# Patient Record
Sex: MALE | Race: WHITE | NOT HISPANIC OR LATINO | Employment: UNEMPLOYED | URBAN - METROPOLITAN AREA
[De-identification: names, ages, dates, MRNs, and addresses within clinical notes are randomized per-mention and may not be internally consistent; named-entity substitution may affect disease eponyms.]

---

## 2023-05-16 ENCOUNTER — OFFICE VISIT (OUTPATIENT)
Dept: PRIMARY CARE | Facility: CLINIC | Age: 28
End: 2023-05-16
Payer: MEDICAID

## 2023-05-16 ENCOUNTER — TELEPHONE (OUTPATIENT)
Dept: PRIMARY CARE | Facility: CLINIC | Age: 28
End: 2023-05-16

## 2023-05-16 ENCOUNTER — LAB (OUTPATIENT)
Dept: LAB | Facility: LAB | Age: 28
End: 2023-05-16
Payer: MEDICAID

## 2023-05-16 VITALS
OXYGEN SATURATION: 97 % | TEMPERATURE: 97.1 F | DIASTOLIC BLOOD PRESSURE: 80 MMHG | HEART RATE: 90 BPM | SYSTOLIC BLOOD PRESSURE: 122 MMHG | WEIGHT: 248 LBS

## 2023-05-16 DIAGNOSIS — K52.9 GASTROENTERITIS: Primary | ICD-10-CM

## 2023-05-16 DIAGNOSIS — Z87.19 HISTORY OF DUODENAL ULCER: ICD-10-CM

## 2023-05-16 DIAGNOSIS — Z86.2 HISTORY OF IRON DEFICIENCY ANEMIA: ICD-10-CM

## 2023-05-16 DIAGNOSIS — Z87.19 HISTORY OF ESOPHAGEAL ULCER: ICD-10-CM

## 2023-05-16 DIAGNOSIS — K21.9 GASTROESOPHAGEAL REFLUX DISEASE WITHOUT ESOPHAGITIS: ICD-10-CM

## 2023-05-16 DIAGNOSIS — K92.1 BLACK STOOL: ICD-10-CM

## 2023-05-16 DIAGNOSIS — K52.9 GASTROENTERITIS: ICD-10-CM

## 2023-05-16 LAB
ALANINE AMINOTRANSFERASE (SGPT) (U/L) IN SER/PLAS: 20 U/L (ref 10–52)
ALBUMIN (G/DL) IN SER/PLAS: 4.1 G/DL (ref 3.4–5)
ALKALINE PHOSPHATASE (U/L) IN SER/PLAS: 69 U/L (ref 33–120)
ANION GAP IN SER/PLAS: 11 MMOL/L (ref 10–20)
ASPARTATE AMINOTRANSFERASE (SGOT) (U/L) IN SER/PLAS: 13 U/L (ref 9–39)
BASOPHILS (10*3/UL) IN BLOOD BY AUTOMATED COUNT: 0.04 X10E9/L (ref 0–0.1)
BASOPHILS/100 LEUKOCYTES IN BLOOD BY AUTOMATED COUNT: 0.6 % (ref 0–2)
BILIRUBIN TOTAL (MG/DL) IN SER/PLAS: 0.6 MG/DL (ref 0–1.2)
CALCIUM (MG/DL) IN SER/PLAS: 8.6 MG/DL (ref 8.6–10.3)
CARBON DIOXIDE, TOTAL (MMOL/L) IN SER/PLAS: 26 MMOL/L (ref 21–32)
CHLORIDE (MMOL/L) IN SER/PLAS: 105 MMOL/L (ref 98–107)
CREATININE (MG/DL) IN SER/PLAS: 0.94 MG/DL (ref 0.5–1.3)
EOSINOPHILS (10*3/UL) IN BLOOD BY AUTOMATED COUNT: 0.21 X10E9/L (ref 0–0.7)
EOSINOPHILS/100 LEUKOCYTES IN BLOOD BY AUTOMATED COUNT: 3.2 % (ref 0–6)
ERYTHROCYTE DISTRIBUTION WIDTH (RATIO) BY AUTOMATED COUNT: 13.7 % (ref 11.5–14.5)
ERYTHROCYTE MEAN CORPUSCULAR HEMOGLOBIN CONCENTRATION (G/DL) BY AUTOMATED: 33 G/DL (ref 32–36)
ERYTHROCYTE MEAN CORPUSCULAR VOLUME (FL) BY AUTOMATED COUNT: 80 FL (ref 80–100)
ERYTHROCYTES (10*6/UL) IN BLOOD BY AUTOMATED COUNT: 5.72 X10E12/L (ref 4.5–5.9)
GFR MALE: >90 ML/MIN/1.73M2
GLUCOSE (MG/DL) IN SER/PLAS: 82 MG/DL (ref 74–99)
HEMATOCRIT (%) IN BLOOD BY AUTOMATED COUNT: 45.8 % (ref 41–52)
HEMOGLOBIN (G/DL) IN BLOOD: 15.1 G/DL (ref 13.5–17.5)
IMMATURE GRANULOCYTES/100 LEUKOCYTES IN BLOOD BY AUTOMATED COUNT: 0.3 % (ref 0–0.9)
LEUKOCYTES (10*3/UL) IN BLOOD BY AUTOMATED COUNT: 6.6 X10E9/L (ref 4.4–11.3)
LYMPHOCYTES (10*3/UL) IN BLOOD BY AUTOMATED COUNT: 1.6 X10E9/L (ref 1.2–4.8)
LYMPHOCYTES/100 LEUKOCYTES IN BLOOD BY AUTOMATED COUNT: 24.2 % (ref 13–44)
MONOCYTES (10*3/UL) IN BLOOD BY AUTOMATED COUNT: 0.79 X10E9/L (ref 0.1–1)
MONOCYTES/100 LEUKOCYTES IN BLOOD BY AUTOMATED COUNT: 11.9 % (ref 2–10)
NEUTROPHILS (10*3/UL) IN BLOOD BY AUTOMATED COUNT: 3.96 X10E9/L (ref 1.2–7.7)
NEUTROPHILS/100 LEUKOCYTES IN BLOOD BY AUTOMATED COUNT: 59.8 % (ref 40–80)
PLATELETS (10*3/UL) IN BLOOD AUTOMATED COUNT: 229 X10E9/L (ref 150–450)
POTASSIUM (MMOL/L) IN SER/PLAS: 4 MMOL/L (ref 3.5–5.3)
PROTEIN TOTAL: 6.7 G/DL (ref 6.4–8.2)
SODIUM (MMOL/L) IN SER/PLAS: 138 MMOL/L (ref 136–145)
UREA NITROGEN (MG/DL) IN SER/PLAS: 8 MG/DL (ref 6–23)

## 2023-05-16 PROCEDURE — 80053 COMPREHEN METABOLIC PANEL: CPT

## 2023-05-16 PROCEDURE — 36415 COLL VENOUS BLD VENIPUNCTURE: CPT

## 2023-05-16 PROCEDURE — 85025 COMPLETE CBC W/AUTO DIFF WBC: CPT

## 2023-05-16 PROCEDURE — 99214 OFFICE O/P EST MOD 30 MIN: CPT | Performed by: PHYSICIAN ASSISTANT

## 2023-05-16 RX ORDER — OMEPRAZOLE 40 MG/1
40 CAPSULE, DELAYED RELEASE ORAL
Qty: 30 CAPSULE | Refills: 1 | Status: SHIPPED | OUTPATIENT
Start: 2023-05-16 | End: 2023-10-26 | Stop reason: SDUPTHER

## 2023-05-16 RX ORDER — OMEPRAZOLE 40 MG/1
CAPSULE, DELAYED RELEASE ORAL
COMMUNITY
Start: 2022-02-02 | End: 2023-05-16 | Stop reason: SDUPTHER

## 2023-05-16 ASSESSMENT — ENCOUNTER SYMPTOMS
SHORTNESS OF BREATH: 0
FEVER: 0
CHILLS: 0

## 2023-05-16 NOTE — PROGRESS NOTES
"Subjective   Patient ID: Bebeto Baker is a 27 y.o. male who presents for Diarrhea (Pt having dark black stool x 2 day/lightheadness).    HPI   Patient complains that 2 days ago started with diarrhea and vomiting and nausea. No vomiting since 2 days ago but still with watery diarrhea. Had mild abdominal cramping. Started taking Pepto -- later in the day and since then watery stools have been black.  No tarry stools. No fevers or chills. Symptoms are starting to improve -- much less nauseous and no vomiting.     Had 2 beers the night before onset. Had 6-7 beers 2 days before onset.  States \"he periodically dose that without problems.\" Felt fine 3 days ago.   No NSAID use.   Is on Omeprazole 40mg nearly every day the past year -- gets reflux when he doesn't take it.   Hx of iron deficiency possibly due to small bleeding ulcer that was found.   EGD in 2017 showed distal esophageal ulcers and duodenal bulb ulcer. Was supposed to have repeat EGD 12 weeks later with Dr Henson but never did that.     Energy level has been ok. Has been feeling a little lightheaded when he first stands up the past few months. Periodically gets a little abdominal discomfort.     States he is leaving for [employer].   Review of Systems   Constitutional:  Negative for chills and fever.   Respiratory:  Negative for shortness of breath.          Objective   /80   Pulse 90   Temp 36.2 °C (97.1 °F)   Wt 112 kg (248 lb)   SpO2 97%     Physical Exam  HENT:      Head: Normocephalic.   Eyes:      General: No scleral icterus.  Cardiovascular:      Rate and Rhythm: Regular rhythm.   Pulmonary:      Effort: Pulmonary effort is normal.      Breath sounds: Normal breath sounds.   Abdominal:      Palpations: Abdomen is soft. There is no mass.      Tenderness: There is no abdominal tenderness.   Skin:     General: Skin is warm and dry.   Neurological:      Mental Status: He is alert.   Psychiatric:         Mood and Affect: Affect normal. "           Assessment/Plan   Diagnoses and all orders for this visit:  Gastroenteritis  -     CBC and Auto Differential; Future  -     Comprehensive Metabolic Panel; Future  Black stool  -     CBC and Auto Differential; Future  -     Comprehensive Metabolic Panel; Future  Gastroesophageal reflux disease without esophagitis  -     omeprazole (PriLOSEC) 40 mg DR capsule; Take 1 capsule (40 mg) by mouth once daily.  -     Referral to Gastroenterology; Future  History of esophageal ulcer  -     omeprazole (PriLOSEC) 40 mg DR capsule; Take 1 capsule (40 mg) by mouth once daily.  -     Referral to Gastroenterology; Future  History of duodenal ulcer  -     omeprazole (PriLOSEC) 40 mg DR capsule; Take 1 capsule (40 mg) by mouth once daily.  -     Referral to Gastroenterology; Future  History of iron deficiency anemia  -     CBC and Auto Differential; Future           Get CBC and CMP today to evaluate further.  Referred back to Dr. Henson (GI).   Advised to take his Prilosec daily consistently and sent in a refill for this.  Suspect his symptoms were likely viral since he is already starting to improve and possibly the black stools were related to the Pepto.  Advised to hold off on using the Pepto and instead can use Imodium if needed.    Hydrate well.  Alcohol the next few days.  Go to ER if significantly worsens.  Follow-up as needed.

## 2023-05-16 NOTE — TELEPHONE ENCOUNTER
----- Message from Faizan Clayton PA-C sent at 5/16/2023  2:16 PM EDT -----  Inform pt that his blood tests were all normal, including no anemia and normal white blood cell count.

## 2023-05-22 ENCOUNTER — APPOINTMENT (OUTPATIENT)
Dept: PRIMARY CARE | Facility: CLINIC | Age: 28
End: 2023-05-22
Payer: MEDICAID

## 2023-08-17 ENCOUNTER — OFFICE VISIT (OUTPATIENT)
Dept: PRIMARY CARE | Facility: CLINIC | Age: 28
End: 2023-08-17
Payer: MEDICAID

## 2023-08-17 VITALS
WEIGHT: 248 LBS | TEMPERATURE: 97.9 F | SYSTOLIC BLOOD PRESSURE: 124 MMHG | OXYGEN SATURATION: 97 % | DIASTOLIC BLOOD PRESSURE: 80 MMHG | HEART RATE: 81 BPM

## 2023-08-17 DIAGNOSIS — S02.2XXD CLOSED FRACTURE OF NASAL BONE WITH ROUTINE HEALING, SUBSEQUENT ENCOUNTER: Primary | ICD-10-CM

## 2023-08-17 DIAGNOSIS — F20.9 SCHIZOPHRENIA, UNSPECIFIED TYPE (MULTI): ICD-10-CM

## 2023-08-17 PROBLEM — G47.33 OBSTRUCTIVE SLEEP APNEA SYNDROME: Status: ACTIVE | Noted: 2017-10-23

## 2023-08-17 PROBLEM — F31.9 BIPOLAR DISORDER, UNSPECIFIED (MULTI): Status: ACTIVE | Noted: 2023-08-17

## 2023-08-17 PROBLEM — K21.9 GASTROESOPHAGEAL REFLUX DISEASE: Status: ACTIVE | Noted: 2021-03-19

## 2023-08-17 PROBLEM — D50.9 IRON DEFICIENCY ANEMIA: Status: ACTIVE | Noted: 2023-08-17

## 2023-08-17 PROCEDURE — 99213 OFFICE O/P EST LOW 20 MIN: CPT | Performed by: FAMILY MEDICINE

## 2023-08-17 PROCEDURE — 1036F TOBACCO NON-USER: CPT | Performed by: FAMILY MEDICINE

## 2023-08-17 RX ORDER — LOPERAMIDE HYDROCHLORIDE 2 MG/1
2 CAPSULE ORAL
COMMUNITY
Start: 2021-03-19 | End: 2023-11-30 | Stop reason: ALTCHOICE

## 2023-08-17 ASSESSMENT — ENCOUNTER SYMPTOMS
CHILLS: 0
SHORTNESS OF BREATH: 0
ABDOMINAL PAIN: 0
COUGH: 0
FATIGUE: 0
NERVOUS/ANXIOUS: 0
FACIAL SWELLING: 0
COLOR CHANGE: 0
DIARRHEA: 0
VOMITING: 0
SINUS PRESSURE: 0
SLEEP DISTURBANCE: 0
SORE THROAT: 0
HALLUCINATIONS: 0
NAUSEA: 0
SINUS PAIN: 0
RHINORRHEA: 0
FEVER: 0
PALPITATIONS: 0
HYPERACTIVE: 0
CONSTIPATION: 0
AGITATION: 0
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
## 2023-08-17 NOTE — PROGRESS NOTES
Subjective   Patient ID: Bebeto Baker is a 28 y.o. male who presents for Hospital Follow-up ([facility] on 8/2/23 Re: broken nose).  HPI    Patient Active Problem List   Diagnosis    Bipolar disorder, unspecified (CMS/LTAC, located within [facility])    Gastroesophageal reflux disease    Idiopathic scoliosis and kyphoscoliosis    Iron deficiency anemia    Migraine headache    Obstructive sleep apnea syndrome    Schizophrenia (CMS/LTAC, located within [facility])       Social Connections: Not on file       Current Outpatient Medications on File Prior to Visit   Medication Sig Dispense Refill    omeprazole (PriLOSEC) 40 mg DR capsule Take 1 capsule (40 mg) by mouth once daily. 30 capsule 1    loperamide (Imodium) 2 mg capsule Take 1 capsule (2 mg) by mouth.       No current facility-administered medications on file prior to visit.        Vitals:    08/17/23 1405   BP: 124/80   Pulse: 81   Temp: 36.6 °C (97.9 °F)   SpO2: 97%     Vitals:    08/17/23 1405   Weight: 112 kg (248 lb)       Review of Systems    Objective     Physical Exam    No visits with results within 2 Month(s) from this visit.   Latest known visit with results is:   Lab on 05/16/2023   Component Date Value Ref Range Status    WBC 05/16/2023 6.6  4.4 - 11.3 x10E9/L Final    RBC 05/16/2023 5.72  4.50 - 5.90 x10E12/L Final    Hemoglobin 05/16/2023 15.1  13.5 - 17.5 g/dL Final    Hematocrit 05/16/2023 45.8  41.0 - 52.0 % Final    MCV 05/16/2023 80  80 - 100 fL Final    MCHC 05/16/2023 33.0  32.0 - 36.0 g/dL Final    Platelets 05/16/2023 229  150 - 450 x10E9/L Final    RDW 05/16/2023 13.7  11.5 - 14.5 % Final    Neutrophils % 05/16/2023 59.8  40.0 - 80.0 % Final    Immature Granulocytes %, Automated 05/16/2023 0.3  0.0 - 0.9 % Final     Immature Granulocyte Count (IG) includes promyelocytes,    myelocytes and metamyelocytes but does not include bands.   Percent differential counts (%) should be interpreted in the   context of the absolute cell counts (cells/L).    Lymphocytes % 05/16/2023 24.2  13.0 - 44.0 %  Final    Monocytes % 05/16/2023 11.9  2.0 - 10.0 % Final    Eosinophils % 05/16/2023 3.2  0.0 - 6.0 % Final    Basophils % 05/16/2023 0.6  0.0 - 2.0 % Final    Neutrophils Absolute 05/16/2023 3.96  1.20 - 7.70 x10E9/L Final    Lymphocytes Absolute 05/16/2023 1.60  1.20 - 4.80 x10E9/L Final    Monocytes Absolute 05/16/2023 0.79  0.10 - 1.00 x10E9/L Final    Eosinophils Absolute 05/16/2023 0.21  0.00 - 0.70 x10E9/L Final    Basophils Absolute 05/16/2023 0.04  0.00 - 0.10 x10E9/L Final    Glucose 05/16/2023 82  74 - 99 mg/dL Final    Sodium 05/16/2023 138  136 - 145 mmol/L Final    Potassium 05/16/2023 4.0  3.5 - 5.3 mmol/L Final    Chloride 05/16/2023 105  98 - 107 mmol/L Final    Bicarbonate 05/16/2023 26  21 - 32 mmol/L Final    Anion Gap 05/16/2023 11  10 - 20 mmol/L Final    Urea Nitrogen 05/16/2023 8  6 - 23 mg/dL Final    Creatinine 05/16/2023 0.94  0.50 - 1.30 mg/dL Final    GFR MALE 05/16/2023 >90  >90 mL/min/1.73m2 Final     CALCULATIONS OF ESTIMATED GFR ARE PERFORMED   USING THE 2021 CKD-EPI STUDY REFIT EQUATION   WITHOUT THE RACE VARIABLE FOR THE IDMS-TRACEABLE   CREATININE METHODS.    https://jasn.asnjournals.org/content/early/2021/09/22/ASN.4730913369    Calcium 05/16/2023 8.6  8.6 - 10.3 mg/dL Final    Albumin 05/16/2023 4.1  3.4 - 5.0 g/dL Final    Alkaline Phosphatase 05/16/2023 69  33 - 120 U/L Final    Total Protein 05/16/2023 6.7  6.4 - 8.2 g/dL Final    AST 05/16/2023 13  9 - 39 U/L Final    Total Bilirubin 05/16/2023 0.6  0.0 - 1.2 mg/dL Final    ALT (SGPT) 05/16/2023 20  10 - 52 U/L Final     Patients treated with Sulfasalazine may generate    falsely decreased results for ALT.       Assessment/Plan   {Assess/PlanSmartLinks:[id number]}

## 2023-10-26 ENCOUNTER — OFFICE VISIT (OUTPATIENT)
Dept: PRIMARY CARE | Facility: CLINIC | Age: 28
End: 2023-10-26
Payer: MEDICAID

## 2023-10-26 VITALS
HEART RATE: 69 BPM | SYSTOLIC BLOOD PRESSURE: 126 MMHG | TEMPERATURE: 97.4 F | DIASTOLIC BLOOD PRESSURE: 84 MMHG | WEIGHT: 253 LBS | OXYGEN SATURATION: 96 %

## 2023-10-26 DIAGNOSIS — T63.441A BEE STING, ACCIDENTAL OR UNINTENTIONAL, INITIAL ENCOUNTER: ICD-10-CM

## 2023-10-26 DIAGNOSIS — K21.9 GASTROESOPHAGEAL REFLUX DISEASE WITHOUT ESOPHAGITIS: Primary | ICD-10-CM

## 2023-10-26 DIAGNOSIS — Z87.19 HISTORY OF DUODENAL ULCER: ICD-10-CM

## 2023-10-26 DIAGNOSIS — Z87.19 HISTORY OF ESOPHAGEAL ULCER: ICD-10-CM

## 2023-10-26 PROCEDURE — 99214 OFFICE O/P EST MOD 30 MIN: CPT | Performed by: PHYSICIAN ASSISTANT

## 2023-10-26 PROCEDURE — 1036F TOBACCO NON-USER: CPT | Performed by: PHYSICIAN ASSISTANT

## 2023-10-26 RX ORDER — OMEPRAZOLE 40 MG/1
40 CAPSULE, DELAYED RELEASE ORAL
Qty: 30 CAPSULE | Refills: 2 | Status: SHIPPED | OUTPATIENT
Start: 2023-10-26 | End: 2024-02-21

## 2023-10-26 RX ORDER — PREDNISONE 20 MG/1
40 TABLET ORAL DAILY
COMMUNITY
Start: 2023-10-24 | End: 2023-10-29

## 2023-10-26 ASSESSMENT — ENCOUNTER SYMPTOMS
BLOOD IN STOOL: 0
CHILLS: 0
DIARRHEA: 0
ABDOMINAL PAIN: 0
VOMITING: 0
FEVER: 0

## 2023-10-26 NOTE — PROGRESS NOTES
Subjective   Patient ID: Bebeto Baker is a 28 y.o. male who presents for GERD (Recheck   Discuss meds).    HPI     Presents for recheck of GERD.   Is on Omeprazole 40mg every day but been out of it recently. Gets bad reflux when he doesn't take the medicine. Some foods even cause breakthrough reflux.   EGD in 2017 showed distal esophageal ulcers and duodenal bulb ulcer. Was supposed to have repeat EGD 12 weeks later with Dr Henson but never did that.  Was referred again earlier this year for that but never went.    Went to [facility] 2 days ago for bee sting on his arm and placed on Prednisone. It has improved. No longer having swelling or redness. Is barely itchy still.        reports that he quit smoking about 21 months ago. His smoking use included cigarettes. He smoked an average of .5 packs per day. He has never used smokeless tobacco.    Review of Systems   Constitutional:  Negative for chills and fever.   Gastrointestinal:  Negative for abdominal pain, blood in stool, diarrhea and vomiting.       Objective   /84   Pulse 69   Temp 36.3 °C (97.4 °F)   Wt 115 kg (253 lb)   SpO2 96%     Physical Exam  Vitals and nursing note reviewed.   HENT:      Head: Normocephalic.   Eyes:      General: No scleral icterus.  Cardiovascular:      Rate and Rhythm: Normal rate and regular rhythm.   Pulmonary:      Effort: Pulmonary effort is normal.      Breath sounds: Normal breath sounds.   Abdominal:      Palpations: Abdomen is soft. There is no mass.      Tenderness: There is no abdominal tenderness.   Skin:     General: Skin is warm and dry.      Comments: Skin on left elbow area without redness or swelling where bee stung him.   Neurological:      Mental Status: He is alert.   Psychiatric:         Mood and Affect: Affect normal.         Assessment/Plan   Diagnoses and all orders for this visit:  Gastroesophageal reflux disease without esophagitis  -     omeprazole (PriLOSEC) 40 mg DR capsule; Take 1 capsule (40  mg) by mouth once daily.  History of esophageal ulcer  -     omeprazole (PriLOSEC) 40 mg DR capsule; Take 1 capsule (40 mg) by mouth once daily.  History of duodenal ulcer  -     omeprazole (PriLOSEC) 40 mg DR capsule; Take 1 capsule (40 mg) by mouth once daily.  Bee sting, accidental or unintentional, initial encounter       Refilled omeprazole 40 mg daily.  Avoid aggravating foods.  Can supplement with OTC famotidine if needed.  Stressed importance of scheduling with his GI Dr. Henson as was previously referred.  Gave him Dr. Henson's phone number for him to call and schedule even.  Can take prednisone for another day or 2 but it appears that localized bee sting reaction has nearly resolved.  Follow-up for recheck with PCP Dr. Paige in 3 to 4 months.

## 2023-11-30 ENCOUNTER — LAB (OUTPATIENT)
Dept: LAB | Facility: LAB | Age: 28
End: 2023-11-30
Payer: MEDICAID

## 2023-11-30 ENCOUNTER — OFFICE VISIT (OUTPATIENT)
Dept: GASTROENTEROLOGY | Facility: CLINIC | Age: 28
End: 2023-11-30
Payer: MEDICAID

## 2023-11-30 VITALS
HEART RATE: 73 BPM | DIASTOLIC BLOOD PRESSURE: 80 MMHG | BODY MASS INDEX: 33.4 KG/M2 | SYSTOLIC BLOOD PRESSURE: 136 MMHG | HEIGHT: 73 IN | WEIGHT: 252 LBS

## 2023-11-30 DIAGNOSIS — K21.9 GASTROESOPHAGEAL REFLUX DISEASE, UNSPECIFIED WHETHER ESOPHAGITIS PRESENT: Primary | ICD-10-CM

## 2023-11-30 DIAGNOSIS — K52.9 CHRONIC DIARRHEA: ICD-10-CM

## 2023-11-30 LAB — TSH SERPL-ACNC: 1.41 MIU/L (ref 0.44–3.98)

## 2023-11-30 PROCEDURE — 99204 OFFICE O/P NEW MOD 45 MIN: CPT | Performed by: NURSE PRACTITIONER

## 2023-11-30 PROCEDURE — 36415 COLL VENOUS BLD VENIPUNCTURE: CPT

## 2023-11-30 PROCEDURE — 1036F TOBACCO NON-USER: CPT | Performed by: NURSE PRACTITIONER

## 2023-11-30 PROCEDURE — 84443 ASSAY THYROID STIM HORMONE: CPT

## 2023-11-30 ASSESSMENT — ENCOUNTER SYMPTOMS
WEAKNESS: 0
ADENOPATHY: 0
CONFUSION: 0
ROS GI COMMENTS: SEE HPI
BRUISES/BLEEDS EASILY: 0
SORE THROAT: 0
DIZZINESS: 0
WOUND: 0
COUGH: 0
ARTHRALGIAS: 0
JOINT SWELLING: 0
TROUBLE SWALLOWING: 0
DIFFICULTY URINATING: 0
SHORTNESS OF BREATH: 0
CHILLS: 0
FEVER: 0
PALPITATIONS: 0

## 2023-11-30 NOTE — ASSESSMENT & PLAN NOTE
Ongoing many years. Previously used imodium. Celiac panel negative in 2021. Will check TSH, pancreatic elastase, calprotectin, cdiff, and schedule for colonoscopy.

## 2023-11-30 NOTE — ASSESSMENT & PLAN NOTE
History of GERD and esophageal ulcers in 2017. Was supposed to repeat EGD but did not have this done. Currently on omeprazole 40mg daily and still having some breakthrough symptoms. Will repeat EGD.

## 2023-11-30 NOTE — PROGRESS NOTES
Subjective   Patient ID: Bebeto Baker is a 28 y.o. male with PMH of bipolar disorder, schizophrenia, CHIDI, migraines, GERD, and EDGAR who was referred by PCP for New Patient Visit (Diarrhea and an over production of saliva and belching. - requesting an EGD/Colon: 2/24/2017).     Patient's PCP is Pranav Paige MD     HPI  Patient has previously seen Dr. Henson for CHIDI in 2017. EGD and colonoscopy were completed 2/24/2017. EGD showed esophageal ulcers and a duodenal ulcer. Colonoscopy showed hemorrhoids. Celiac panel was negative in 2021.     He was referred back to GI for GERD and diarrhea. He has been on omeprazole 40mg daily for about 2 years but still has breakthrough heartburn at times. He has a burning sensation in his upper abdomen and chest. He has had worsening belching and increased saliva production over the last couple months. He does not drink carbonated beverages. He denies N/V, dysphagia, or melena.     He reports diarrhea for years. He has diarrhea at least 3-4 days per week; usually only once when it occurs. Not aggravated by any foods in particular. He sometimes will have a hard stool and straining, but this is infrequent. He denies abdominal pain, cramping, and rectal bleeding.       Summary of endoscopies:  - EGD 2/2017: esophageal ulcer, 2-3cm hiatal hernia, and duodenal ulcer.   - Colonoscopy 2/2017: hemorrhoids; otherwise normal colon     Social Hx:  Tobacco: none  E-cigarette: current use  Etoh: twice a month   Recreational drug use: none  NSAIDs: none      Family Hx:  No GI malignancy, IBD, or pancreatitis     Review of Systems:  Review of Systems   Constitutional:  Negative for chills and fever.   HENT:  Negative for sore throat and trouble swallowing.    Respiratory:  Negative for cough and shortness of breath.    Cardiovascular:  Negative for chest pain and palpitations.   Gastrointestinal:         SEE HPI   Endocrine: Negative for cold intolerance and heat intolerance.   Genitourinary:   Negative for difficulty urinating.   Musculoskeletal:  Negative for arthralgias and joint swelling.   Skin:  Negative for rash and wound.   Neurological:  Negative for dizziness and weakness.   Hematological:  Negative for adenopathy. Does not bruise/bleed easily.   Psychiatric/Behavioral:  Negative for confusion.         Medications:  Prior to Admission medications    Medication Sig Start Date End Date Taking? Authorizing Provider   omeprazole (PriLOSEC) 40 mg DR capsule Take 1 capsule (40 mg) by mouth once daily. 10/26/23  Yes Faizan Clayton PA-C   loperamide (Imodium) 2 mg capsule Take 1 capsule (2 mg) by mouth. 3/19/21 11/30/23  Historical Provider, MD       Allergies:  Patient has no known allergies.    Past Medical History:  He has a past medical history of Otitis media, unspecified, right ear (08/27/2017), Personal history of other diseases of the respiratory system (08/27/2017), Personal history of other specified conditions (06/10/2017), Personal history of other specified conditions (05/31/2017), and Tachycardia, unspecified.    Past Surgical History:  He has a past surgical history that includes Other surgical history (07/13/2016).    Social History:  He reports that he quit smoking about 22 months ago. His smoking use included cigarettes. He smoked an average of .5 packs per day. He has never used smokeless tobacco. He reports current alcohol use. He reports that he does not use drugs.    Objective   Physical exam:  Physical Exam  Constitutional:       General: He is not in acute distress.     Appearance: Normal appearance.   HENT:      Mouth/Throat:      Mouth: Mucous membranes are moist.      Comments: pink  Eyes:      Conjunctiva/sclera: Conjunctivae normal.      Pupils: Pupils are equal, round, and reactive to light.   Cardiovascular:      Rate and Rhythm: Normal rate and regular rhythm.      Heart sounds: No murmur heard.  Pulmonary:      Effort: Pulmonary effort is normal.      Breath sounds:  Normal breath sounds.   Abdominal:      General: Bowel sounds are normal. There is no distension.      Palpations: Abdomen is soft.      Tenderness: There is no abdominal tenderness. There is no guarding.   Skin:     General: Skin is warm and dry.      Coloration: Skin is not jaundiced.   Neurological:      Mental Status: He is alert and oriented to person, place, and time.   Psychiatric:         Mood and Affect: Mood normal.         Behavior: Behavior normal.          Assessment/Plan   Problem List Items Addressed This Visit             ICD-10-CM    Gastroesophageal reflux disease - Primary K21.9     History of GERD and esophageal ulcers in 2017. Was supposed to repeat EGD but did not have this done. Currently on omeprazole 40mg daily and still having some breakthrough symptoms. Will repeat EGD.          Relevant Orders    EGD    Chronic diarrhea K52.9     Ongoing many years. Previously used imodium. Celiac panel negative in 2021. Will check TSH, pancreatic elastase, calprotectin, cdiff, and schedule for colonoscopy.          Relevant Orders    Pancreatic Elastase, Fecal    Calprotectin Stool    C. difficile, PCR    TSH with reflex to Free T4 if abnormal    Colonoscopy Diagnostic              Charissa Coles, APRN-CNP

## 2023-11-30 NOTE — PATIENT INSTRUCTIONS
Thank you for coming to your appointment today   - Continue the omeprazole 40mg daily  - Do the blood work and stool studies in the outpatient lab   - We will schedule you for an EGD and colonoscopy in the endoscopy center   - Follow the bowel prep instructions given to you today   - Follow up after procedure     Please call [phone] with any questions or concerns

## 2024-01-17 ENCOUNTER — ANESTHESIA EVENT (OUTPATIENT)
Dept: GASTROENTEROLOGY | Facility: HOSPITAL | Age: 29
End: 2024-01-17
Payer: MEDICAID

## 2024-01-19 ENCOUNTER — HOSPITAL ENCOUNTER (OUTPATIENT)
Dept: GASTROENTEROLOGY | Facility: HOSPITAL | Age: 29
Discharge: HOME | End: 2024-01-19
Payer: MEDICAID

## 2024-01-19 ENCOUNTER — ANESTHESIA (OUTPATIENT)
Dept: GASTROENTEROLOGY | Facility: HOSPITAL | Age: 29
End: 2024-01-19
Payer: MEDICAID

## 2024-01-19 VITALS
BODY MASS INDEX: 31.44 KG/M2 | WEIGHT: 245 LBS | DIASTOLIC BLOOD PRESSURE: 96 MMHG | OXYGEN SATURATION: 99 % | TEMPERATURE: 97.1 F | HEIGHT: 74 IN | SYSTOLIC BLOOD PRESSURE: 131 MMHG | RESPIRATION RATE: 18 BRPM | HEART RATE: 71 BPM

## 2024-01-19 DIAGNOSIS — K21.9 GASTROESOPHAGEAL REFLUX DISEASE, UNSPECIFIED WHETHER ESOPHAGITIS PRESENT: ICD-10-CM

## 2024-01-19 DIAGNOSIS — K52.9 CHRONIC DIARRHEA: ICD-10-CM

## 2024-01-19 PROCEDURE — 7100000009 HC PHASE TWO TIME - INITIAL BASE CHARGE: Performed by: INTERNAL MEDICINE

## 2024-01-19 PROCEDURE — 0753T DGTZ GLS MCRSCP SLD LEVEL IV: CPT | Mod: TC,SUR,PORLAB | Performed by: INTERNAL MEDICINE

## 2024-01-19 PROCEDURE — 2500000004 HC RX 250 GENERAL PHARMACY W/ HCPCS (ALT 636 FOR OP/ED): Performed by: NURSE ANESTHETIST, CERTIFIED REGISTERED

## 2024-01-19 PROCEDURE — 3700000001 HC GENERAL ANESTHESIA TIME - INITIAL BASE CHARGE: Performed by: INTERNAL MEDICINE

## 2024-01-19 PROCEDURE — 2500000004 HC RX 250 GENERAL PHARMACY W/ HCPCS (ALT 636 FOR OP/ED): Performed by: INTERNAL MEDICINE

## 2024-01-19 PROCEDURE — 3700000002 HC GENERAL ANESTHESIA TIME - EACH INCREMENTAL 1 MINUTE: Performed by: INTERNAL MEDICINE

## 2024-01-19 PROCEDURE — 7100000010 HC PHASE TWO TIME - EACH INCREMENTAL 1 MINUTE: Performed by: INTERNAL MEDICINE

## 2024-01-19 PROCEDURE — 2500000005 HC RX 250 GENERAL PHARMACY W/O HCPCS: Performed by: NURSE ANESTHETIST, CERTIFIED REGISTERED

## 2024-01-19 PROCEDURE — 43239 EGD BIOPSY SINGLE/MULTIPLE: CPT | Performed by: INTERNAL MEDICINE

## 2024-01-19 PROCEDURE — 45380 COLONOSCOPY AND BIOPSY: CPT | Performed by: INTERNAL MEDICINE

## 2024-01-19 PROCEDURE — 88305 TISSUE EXAM BY PATHOLOGIST: CPT | Performed by: PATHOLOGY

## 2024-01-19 RX ORDER — LIDOCAINE HYDROCHLORIDE 20 MG/ML
INJECTION, SOLUTION EPIDURAL; INFILTRATION; INTRACAUDAL; PERINEURAL AS NEEDED
Status: DISCONTINUED | OUTPATIENT
Start: 2024-01-19 | End: 2024-01-19

## 2024-01-19 RX ORDER — FENTANYL CITRATE 50 UG/ML
INJECTION, SOLUTION INTRAMUSCULAR; INTRAVENOUS AS NEEDED
Status: DISCONTINUED | OUTPATIENT
Start: 2024-01-19 | End: 2024-01-19

## 2024-01-19 RX ORDER — PROPOFOL 10 MG/ML
INJECTION, EMULSION INTRAVENOUS AS NEEDED
Status: DISCONTINUED | OUTPATIENT
Start: 2024-01-19 | End: 2024-01-19

## 2024-01-19 RX ORDER — SODIUM CHLORIDE 9 MG/ML
30 INJECTION, SOLUTION INTRAVENOUS CONTINUOUS
Status: DISCONTINUED | OUTPATIENT
Start: 2024-01-19 | End: 2024-01-20 | Stop reason: HOSPADM

## 2024-01-19 RX ADMIN — PROPOFOL 50 MG: 10 INJECTION, EMULSION INTRAVENOUS at 13:10

## 2024-01-19 RX ADMIN — LIDOCAINE HYDROCHLORIDE 40 MG: 20 INJECTION, SOLUTION EPIDURAL; INFILTRATION; INTRACAUDAL; PERINEURAL at 12:55

## 2024-01-19 RX ADMIN — PROPOFOL 50 MG: 10 INJECTION, EMULSION INTRAVENOUS at 13:22

## 2024-01-19 RX ADMIN — SODIUM CHLORIDE: 9 INJECTION, SOLUTION INTRAVENOUS at 12:44

## 2024-01-19 RX ADMIN — PROPOFOL 50 MG: 10 INJECTION, EMULSION INTRAVENOUS at 13:26

## 2024-01-19 RX ADMIN — PROPOFOL 50 MG: 10 INJECTION, EMULSION INTRAVENOUS at 12:58

## 2024-01-19 RX ADMIN — FENTANYL CITRATE 25 MCG: 50 INJECTION, SOLUTION INTRAMUSCULAR; INTRAVENOUS at 12:57

## 2024-01-19 RX ADMIN — PROPOFOL 50 MG: 10 INJECTION, EMULSION INTRAVENOUS at 13:15

## 2024-01-19 RX ADMIN — PROPOFOL 50 MG: 10 INJECTION, EMULSION INTRAVENOUS at 13:06

## 2024-01-19 RX ADMIN — PROPOFOL 100 MG: 10 INJECTION, EMULSION INTRAVENOUS at 12:55

## 2024-01-19 RX ADMIN — FENTANYL CITRATE 25 MCG: 50 INJECTION, SOLUTION INTRAMUSCULAR; INTRAVENOUS at 13:02

## 2024-01-19 RX ADMIN — PROPOFOL 50 MG: 10 INJECTION, EMULSION INTRAVENOUS at 12:57

## 2024-01-19 RX ADMIN — PROPOFOL 50 MG: 10 INJECTION, EMULSION INTRAVENOUS at 13:18

## 2024-01-19 RX ADMIN — FENTANYL CITRATE 50 MCG: 50 INJECTION, SOLUTION INTRAMUSCULAR; INTRAVENOUS at 12:55

## 2024-01-19 RX ADMIN — PROPOFOL 50 MG: 10 INJECTION, EMULSION INTRAVENOUS at 13:02

## 2024-01-19 RX ADMIN — PROPOFOL 50 MG: 10 INJECTION, EMULSION INTRAVENOUS at 13:20

## 2024-01-19 SDOH — HEALTH STABILITY: MENTAL HEALTH: CURRENT SMOKER: 1

## 2024-01-19 ASSESSMENT — COLUMBIA-SUICIDE SEVERITY RATING SCALE - C-SSRS
2. HAVE YOU ACTUALLY HAD ANY THOUGHTS OF KILLING YOURSELF?: NO
1. IN THE PAST MONTH, HAVE YOU WISHED YOU WERE DEAD OR WISHED YOU COULD GO TO SLEEP AND NOT WAKE UP?: NO
6. HAVE YOU EVER DONE ANYTHING, STARTED TO DO ANYTHING, OR PREPARED TO DO ANYTHING TO END YOUR LIFE?: NO

## 2024-01-19 ASSESSMENT — PAIN - FUNCTIONAL ASSESSMENT: PAIN_FUNCTIONAL_ASSESSMENT: 0-10

## 2024-01-19 ASSESSMENT — PAIN SCALES - GENERAL
PAIN_LEVEL: 0
PAINLEVEL_OUTOF10: 0 - NO PAIN

## 2024-01-19 NOTE — ANESTHESIA PREPROCEDURE EVALUATION
Patient: Bebeto Baker    Procedure Information       Date/Time: 01/19/24 1230    Scheduled providers: Terry Henson MD    Procedures:       EGD      COLONOSCOPY    Location: [facility]            Relevant Problems   Anesthesia (within normal limits)      Cardiovascular (within normal limits)      Endocrine (within normal limits)      GI   (+) Chronic diarrhea   (+) Gastroesophageal reflux disease      /Renal (within normal limits)      Neuro/Psych   (+) Bipolar disorder, unspecified (CMS/HCC)   (+) Schizophrenia (CMS/HCC)      Pulmonary   (+) Obstructive sleep apnea syndrome      GI/Hepatic (within normal limits)      Hematology   (+) Iron deficiency anemia      Musculoskeletal   (+) Idiopathic scoliosis and kyphoscoliosis      Eyes, Ears, Nose, and Throat (within normal limits)       Clinical information reviewed:   Tobacco  Allergies  Meds   Med Hx  Surg Hx   Fam Hx  Soc Hx        NPO Detail:  NPO/Void Status  Date of Last Liquid: 01/18/24  Time of Last Liquid: 2300  Date of Last Solid: 01/17/24  Last Intake Type: Clear fluids         Physical Exam    Airway  Mallampati: II  TM distance: >3 FB  Neck ROM: full     Cardiovascular - normal exam  Rhythm: regular     Dental - normal exam     Pulmonary - normal exam     Abdominal - normal exam         Anesthesia Plan    History of general anesthesia?: yes  History of complications of general anesthesia?: no    ASA 2     MAC     The patient is a current smoker.  Patient was previously instructed to abstain from smoking on day of procedure.  Patient did not smoke on day of procedure.    intravenous induction   Anesthetic plan and risks discussed with patient.

## 2024-01-19 NOTE — ANESTHESIA POSTPROCEDURE EVALUATION
Patient: Bebeto Baker    Procedure Summary       Date: 01/19/24 Room / Location: [facility]    Anesthesia Start: 1252 Anesthesia Stop: 1338    Procedures:       EGD      COLONOSCOPY Diagnosis:       Gastroesophageal reflux disease, unspecified whether esophagitis present      Chronic diarrhea    Scheduled Providers: Terry Henson MD Responsible Provider: OMAYRA Garsia    Anesthesia Type: MAC ASA Status: 2            Anesthesia Type: MAC    Vitals Value Taken Time   /103 01/19/24 1337   Temp 36.1 °C (97 °F) 01/19/24 1337   Pulse 78 01/19/24 1337   Resp 18 01/19/24 1337   SpO2 99 % 01/19/24 1337       Anesthesia Post Evaluation    Patient location during evaluation: bedside  Patient participation: complete - patient participated  Level of consciousness: awake and alert  Pain score: 0  Pain management: adequate  Airway patency: patent  Cardiovascular status: acceptable  Respiratory status: acceptable  Hydration status: acceptable  Postoperative Nausea and Vomiting: none    There were no known notable events for this encounter.

## 2024-01-19 NOTE — H&P
History Of Present Illness  Bebeto Baker is a 28 y.o. male presenting for evaluation of chronic diarrhea and excessive production of saliva ( nausea ).  Previous evaluation in 2017 revealed esophageal ulcers and evidence of internal hemorrhoids..     Past Medical History  He has a past medical history of Diarrhea, Esophageal ulcer, GERD (gastroesophageal reflux disease), Otitis media, unspecified, right ear (08/27/2017), Personal history of other diseases of the respiratory system (08/27/2017), Personal history of other specified conditions (06/10/2017), Personal history of other specified conditions (05/31/2017), Sleep apnea, and Tachycardia, unspecified.    Surgical History  He has a past surgical history that includes Other surgical history (07/13/2016).     Social History  He reports that he quit smoking about 2 years ago. His smoking use included cigarettes. He smoked an average of .5 packs per day. He has never used smokeless tobacco. He reports current alcohol use. He reports that he does not use drugs.    Family History  Family History   Problem Relation Name Age of Onset    Lung cancer Father          Allergies  Patient has no known allergies.    Review of Systems  Constitutional: no fever, no chills, fatigue,  not feeling tired and no recent weight loss. No reports of dizziness.  SKIN: No skin rash or lesions  EYE: No pain photophobia, diplopia or blurred vision  EAR: No discharge, pain or hearing loss  NOSE: No congestion, epistaxis or obstruction  RESPIRATORY: No cough , dyspnea or  chest pain   CV: No chest pain, lower extremity swelling or palpitations  GE: No abdominal pain, nausea, vomiting, dysphagia or odynophagia. Denied constipation , diarrhea  : No dysuria or hematuria, urinary incontinence or urine frequency  NEURO: Denied weakness, confusion, dizziness, or numbness   PSYCH : Denies anxiety, hallucinations or insomnia  HEME/ LYMPH : Denied bleeding , bruising or enlarged  "nodes  ENDOCRINE: No change in weight, polydipsia, or abnormal bleeding  .        Physical Exam  Head and neck examinations were  unremarkable. There was no temporal wasting. No jaundice noted. No thyromegaly.  No carotid bruits.  There is no peripheral lymphadenopathy.  Lungs were clear to auscultation. There when no rales, rhonchi or wheezes.  Cardiac examination revealed normal heart sounds. Rhythm was sinus . There were no murmurs.  Abdomen was full and soft. There was no organomegaly. Bowel sounds were normo- active. There was no ascites. The abdomen was nontender.  Rectal examination was not done.  Extremities: No edema or joint swelling.  Neurological examination: Patient was alert, oriented in person place, and time. There when no focal neurological signs. Cranial nerves were grossly intact. No evidence of encephalopathy. There was no asterixis. The  plantar response was flexor.    Last Recorded Vitals  Blood pressure 128/84, pulse 73, temperature 36.4 °C (97.6 °F), temperature source Temporal, resp. rate 16, height 1.88 m (6' 2\"), weight 111 kg (245 lb), SpO2 96 %.    Relevant Results        None     Assessment/Plan  Patient presented for further evaluation of chronic diarrhea and persistent nausea.  He has a past medical history of esophageal ulcers.  Will proceed with upper GI endoscopy and colonoscopy as planned.    Terry Henson MD  "

## 2024-01-22 NOTE — ADDENDUM NOTE
Encounter addended by: Alisha Foreman RN on: 1/22/2024 12:45 PM   Actions taken: Contacts section saved, Flowsheet accepted

## 2024-01-24 LAB
LABORATORY COMMENT REPORT: NORMAL
PATH REPORT.FINAL DX SPEC: NORMAL
PATH REPORT.GROSS SPEC: NORMAL
PATH REPORT.RELEVANT HX SPEC: NORMAL
PATH REPORT.TOTAL CANCER: NORMAL

## 2024-01-25 ENCOUNTER — LAB (OUTPATIENT)
Dept: LAB | Facility: LAB | Age: 29
End: 2024-01-25
Payer: MEDICAID

## 2024-01-25 DIAGNOSIS — K52.9 CHRONIC DIARRHEA: ICD-10-CM

## 2024-01-25 PROCEDURE — 83993 ASSAY FOR CALPROTECTIN FECAL: CPT

## 2024-01-25 PROCEDURE — 87493 C DIFF AMPLIFIED PROBE: CPT

## 2024-01-25 PROCEDURE — 82653 EL-1 FECAL QUANTITATIVE: CPT

## 2024-01-26 LAB — C DIF TOX TCDA+TCDB STL QL NAA+PROBE: NOT DETECTED

## 2024-01-30 LAB
CALPROTECTIN STL-MCNT: 168 UG/G
ELASTASE PANC STL-MCNT: >800 UG/G

## 2024-02-01 DIAGNOSIS — R19.5 ELEVATED FECAL CALPROTECTIN: ICD-10-CM

## 2024-02-01 DIAGNOSIS — K52.9 CHRONIC DIARRHEA: Primary | ICD-10-CM

## 2024-02-20 ENCOUNTER — HOSPITAL ENCOUNTER (OUTPATIENT)
Dept: RADIOLOGY | Facility: HOSPITAL | Age: 29
Discharge: HOME | End: 2024-02-20
Payer: MEDICAID

## 2024-02-20 DIAGNOSIS — K21.9 GASTROESOPHAGEAL REFLUX DISEASE WITHOUT ESOPHAGITIS: ICD-10-CM

## 2024-02-20 DIAGNOSIS — K52.9 CHRONIC DIARRHEA: ICD-10-CM

## 2024-02-20 DIAGNOSIS — Z87.19 HISTORY OF DUODENAL ULCER: ICD-10-CM

## 2024-02-20 DIAGNOSIS — R19.5 ELEVATED FECAL CALPROTECTIN: ICD-10-CM

## 2024-02-20 DIAGNOSIS — Z87.19 HISTORY OF ESOPHAGEAL ULCER: ICD-10-CM

## 2024-02-20 PROCEDURE — 74177 CT ABD & PELVIS W/CONTRAST: CPT | Performed by: RADIOLOGY

## 2024-02-20 PROCEDURE — 74177 CT ABD & PELVIS W/CONTRAST: CPT

## 2024-02-20 PROCEDURE — 2500000001 HC RX 250 WO HCPCS SELF ADMINISTERED DRUGS (ALT 637 FOR MEDICARE OP): Performed by: NURSE PRACTITIONER

## 2024-02-20 PROCEDURE — 2550000001 HC RX 255 CONTRASTS: Performed by: NURSE PRACTITIONER

## 2024-02-20 RX ADMIN — IOHEXOL 75 ML: 350 INJECTION, SOLUTION INTRAVENOUS at 11:14

## 2024-02-20 RX ADMIN — BARIUM SULFATE 1350 ML: 1 SUSPENSION ORAL at 10:10

## 2024-02-21 RX ORDER — OMEPRAZOLE 40 MG/1
40 CAPSULE, DELAYED RELEASE ORAL DAILY
Qty: 30 CAPSULE | Refills: 0 | Status: SHIPPED | OUTPATIENT
Start: 2024-02-21 | End: 2024-03-07 | Stop reason: SDUPTHER

## 2024-03-07 ENCOUNTER — OFFICE VISIT (OUTPATIENT)
Dept: PRIMARY CARE | Facility: CLINIC | Age: 29
End: 2024-03-07
Payer: MEDICAID

## 2024-03-07 VITALS
DIASTOLIC BLOOD PRESSURE: 74 MMHG | OXYGEN SATURATION: 96 % | TEMPERATURE: 97.2 F | BODY MASS INDEX: 33.38 KG/M2 | WEIGHT: 260 LBS | SYSTOLIC BLOOD PRESSURE: 120 MMHG | HEART RATE: 82 BPM

## 2024-03-07 DIAGNOSIS — K21.9 GASTROESOPHAGEAL REFLUX DISEASE WITHOUT ESOPHAGITIS: ICD-10-CM

## 2024-03-07 DIAGNOSIS — Z87.19 HISTORY OF DUODENAL ULCER: ICD-10-CM

## 2024-03-07 DIAGNOSIS — Z00.00 ROUTINE ADULT HEALTH MAINTENANCE: Primary | ICD-10-CM

## 2024-03-07 DIAGNOSIS — Z87.19 HISTORY OF ESOPHAGEAL ULCER: ICD-10-CM

## 2024-03-07 DIAGNOSIS — F20.9 SCHIZOPHRENIA, UNSPECIFIED TYPE (MULTI): ICD-10-CM

## 2024-03-07 PROCEDURE — 99213 OFFICE O/P EST LOW 20 MIN: CPT | Performed by: FAMILY MEDICINE

## 2024-03-07 PROCEDURE — 99395 PREV VISIT EST AGE 18-39: CPT | Performed by: FAMILY MEDICINE

## 2024-03-07 PROCEDURE — 1036F TOBACCO NON-USER: CPT | Performed by: FAMILY MEDICINE

## 2024-03-07 RX ORDER — OMEPRAZOLE 40 MG/1
40 CAPSULE, DELAYED RELEASE ORAL DAILY
Qty: 90 CAPSULE | Refills: 3 | Status: SHIPPED | OUTPATIENT
Start: 2024-03-07

## 2024-03-07 ASSESSMENT — ENCOUNTER SYMPTOMS
MYALGIAS: 0
WEAKNESS: 0
APPETITE CHANGE: 0
RHINORRHEA: 0
WHEEZING: 0
FEVER: 0
FREQUENCY: 0
ABDOMINAL PAIN: 0
PALPITATIONS: 0
DYSURIA: 0
DIFFICULTY URINATING: 0
VOMITING: 0
NAUSEA: 0
ARTHRALGIAS: 0
DIZZINESS: 0
DIARRHEA: 1
ACTIVITY CHANGE: 0
SHORTNESS OF BREATH: 0
NUMBNESS: 0
LIGHT-HEADEDNESS: 0
CHILLS: 0
COUGH: 0
SORE THROAT: 0
CONSTIPATION: 0

## 2024-03-07 NOTE — PROGRESS NOTES
Subjective   Patient ID: Bebeto Baker is a 28 y.o. male who presents for GERD (recheck) and CPE    GERD  He reports no abdominal pain, no chest pain, no coughing, no nausea, no sore throat or no wheezing.      GERD - Symptoms have been well controlled. He has been taking PPI everyday. He continues to have diarrhea daily. EGD showed hiatal hernia. Cscope showed 2 polyps and 2 small hemorrhoids. Following with GI    Review of Systems   Constitutional:  Negative for activity change, appetite change, chills and fever.   HENT:  Negative for congestion, ear pain, postnasal drip, rhinorrhea and sore throat.    Respiratory:  Negative for cough, shortness of breath and wheezing.    Cardiovascular:  Negative for chest pain and palpitations.   Gastrointestinal:  Positive for diarrhea. Negative for abdominal pain, constipation, nausea and vomiting.   Genitourinary:  Negative for difficulty urinating, dysuria and frequency.   Musculoskeletal:  Negative for arthralgias and myalgias.   Skin:  Negative for rash.   Neurological:  Negative for dizziness, weakness, light-headedness and numbness.       Objective   /74   Pulse 82   Temp 36.2 °C (97.2 °F)   Wt 118 kg (260 lb)   SpO2 96%   BMI 33.38 kg/m²     Physical Exam  Constitutional:       General: He is not in acute distress.     Appearance: Normal appearance.   HENT:      Head: Normocephalic and atraumatic.      Mouth/Throat:      Mouth: Mucous membranes are moist.      Pharynx: Oropharynx is clear.   Eyes:      Conjunctiva/sclera: Conjunctivae normal.   Cardiovascular:      Rate and Rhythm: Normal rate and regular rhythm.      Pulses: Normal pulses.      Heart sounds: Normal heart sounds. No murmur heard.  Pulmonary:      Effort: Pulmonary effort is normal. No respiratory distress.      Breath sounds: Normal breath sounds. No wheezing or rhonchi.   Abdominal:      General: There is no distension.      Tenderness: There is no abdominal tenderness. There is no  guarding or rebound.   Musculoskeletal:         General: Normal range of motion.      Cervical back: Normal range of motion and neck supple. No tenderness.   Lymphadenopathy:      Cervical: No cervical adenopathy.   Skin:     General: Skin is warm.   Neurological:      General: No focal deficit present.      Mental Status: He is alert and oriented to person, place, and time.   Psychiatric:         Mood and Affect: Mood normal.         Assessment/Plan   Problem List Items Addressed This Visit             ICD-10-CM    Gastroesophageal reflux disease K21.9    Relevant Medications    omeprazole (PriLOSEC) 40 mg DR capsule    Other Relevant Orders    Lipid Panel    Comprehensive Metabolic Panel    CBC and Auto Differential    Schizophrenia (CMS/HCC) F20.9    Relevant Orders    Lipid Panel    Comprehensive Metabolic Panel    CBC and Auto Differential     Other Visit Diagnoses         Codes    Routine adult health maintenance    -  Primary Z00.00    Relevant Orders    Lipid Panel    Comprehensive Metabolic Panel    CBC and Auto Differential    History of esophageal ulcer     Z87.19    Relevant Medications    omeprazole (PriLOSEC) 40 mg DR capsule    Other Relevant Orders    Lipid Panel    Comprehensive Metabolic Panel    CBC and Auto Differential    History of duodenal ulcer     Z87.19    Relevant Medications    omeprazole (PriLOSEC) 40 mg DR capsule    Other Relevant Orders    Lipid Panel    Comprehensive Metabolic Panel    CBC and Auto Differential        Continue PPI  Check labs prior to next visit    Follow up in 1 year.

## 2024-09-14 ENCOUNTER — HOSPITAL ENCOUNTER (EMERGENCY)
Facility: HOSPITAL | Age: 29
Discharge: HOME | End: 2024-09-14
Attending: EMERGENCY MEDICINE
Payer: MEDICAID

## 2024-09-14 VITALS
HEART RATE: 79 BPM | SYSTOLIC BLOOD PRESSURE: 137 MMHG | TEMPERATURE: 97.3 F | RESPIRATION RATE: 16 BRPM | BODY MASS INDEX: 33.37 KG/M2 | OXYGEN SATURATION: 98 % | HEIGHT: 74 IN | DIASTOLIC BLOOD PRESSURE: 86 MMHG | WEIGHT: 260 LBS

## 2024-09-14 DIAGNOSIS — K29.00 ACUTE GASTRITIS, PRESENCE OF BLEEDING UNSPECIFIED, UNSPECIFIED GASTRITIS TYPE: Primary | ICD-10-CM

## 2024-09-14 LAB
ALBUMIN SERPL BCP-MCNC: 4.6 G/DL (ref 3.4–5)
ALP SERPL-CCNC: 78 U/L (ref 33–120)
ALT SERPL W P-5'-P-CCNC: 23 U/L (ref 10–52)
ANION GAP SERPL CALC-SCNC: 11 MMOL/L (ref 10–20)
AST SERPL W P-5'-P-CCNC: 17 U/L (ref 9–39)
BASOPHILS # BLD AUTO: 0.06 X10*3/UL (ref 0–0.1)
BASOPHILS NFR BLD AUTO: 0.5 %
BILIRUB SERPL-MCNC: 0.6 MG/DL (ref 0–1.2)
BUN SERPL-MCNC: 8 MG/DL (ref 6–23)
CALCIUM SERPL-MCNC: 10.3 MG/DL (ref 8.6–10.3)
CHLORIDE SERPL-SCNC: 102 MMOL/L (ref 98–107)
CO2 SERPL-SCNC: 27 MMOL/L (ref 21–32)
CREAT SERPL-MCNC: 1.01 MG/DL (ref 0.5–1.3)
EGFRCR SERPLBLD CKD-EPI 2021: >90 ML/MIN/1.73M*2
EOSINOPHIL # BLD AUTO: 0.1 X10*3/UL (ref 0–0.7)
EOSINOPHIL NFR BLD AUTO: 0.9 %
ERYTHROCYTE [DISTWIDTH] IN BLOOD BY AUTOMATED COUNT: 14.8 % (ref 11.5–14.5)
GLUCOSE SERPL-MCNC: 82 MG/DL (ref 74–99)
HCT VFR BLD AUTO: 44.2 % (ref 41–52)
HGB BLD-MCNC: 14.5 G/DL (ref 13.5–17.5)
IMM GRANULOCYTES # BLD AUTO: 0.03 X10*3/UL (ref 0–0.7)
IMM GRANULOCYTES NFR BLD AUTO: 0.3 % (ref 0–0.9)
INR PPP: 1.1 (ref 0.9–1.1)
LYMPHOCYTES # BLD AUTO: 2.02 X10*3/UL (ref 1.2–4.8)
LYMPHOCYTES NFR BLD AUTO: 17.6 %
MCH RBC QN AUTO: 24.6 PG (ref 26–34)
MCHC RBC AUTO-ENTMCNC: 32.8 G/DL (ref 32–36)
MCV RBC AUTO: 75 FL (ref 80–100)
MONOCYTES # BLD AUTO: 0.84 X10*3/UL (ref 0.1–1)
MONOCYTES NFR BLD AUTO: 7.3 %
NEUTROPHILS # BLD AUTO: 8.41 X10*3/UL (ref 1.2–7.7)
NEUTROPHILS NFR BLD AUTO: 73.4 %
NRBC BLD-RTO: 0 /100 WBCS (ref 0–0)
PLATELET # BLD AUTO: 259 X10*3/UL (ref 150–450)
POTASSIUM SERPL-SCNC: 4.1 MMOL/L (ref 3.5–5.3)
PROT SERPL-MCNC: 7.6 G/DL (ref 6.4–8.2)
PROTHROMBIN TIME: 11.9 SECONDS (ref 9.8–12.8)
RBC # BLD AUTO: 5.9 X10*6/UL (ref 4.5–5.9)
SODIUM SERPL-SCNC: 136 MMOL/L (ref 136–145)
WBC # BLD AUTO: 11.5 X10*3/UL (ref 4.4–11.3)

## 2024-09-14 PROCEDURE — 80053 COMPREHEN METABOLIC PANEL: CPT | Performed by: EMERGENCY MEDICINE

## 2024-09-14 PROCEDURE — 36415 COLL VENOUS BLD VENIPUNCTURE: CPT | Performed by: EMERGENCY MEDICINE

## 2024-09-14 PROCEDURE — 85025 COMPLETE CBC W/AUTO DIFF WBC: CPT | Performed by: EMERGENCY MEDICINE

## 2024-09-14 PROCEDURE — 99283 EMERGENCY DEPT VISIT LOW MDM: CPT

## 2024-09-14 PROCEDURE — 85610 PROTHROMBIN TIME: CPT | Performed by: EMERGENCY MEDICINE

## 2024-09-14 PROCEDURE — 2500000001 HC RX 250 WO HCPCS SELF ADMINISTERED DRUGS (ALT 637 FOR MEDICARE OP): Performed by: EMERGENCY MEDICINE

## 2024-09-14 RX ORDER — SUCRALFATE 1 G/1
1 TABLET ORAL ONCE
Status: COMPLETED | OUTPATIENT
Start: 2024-09-14 | End: 2024-09-14

## 2024-09-14 RX ORDER — SUCRALFATE 1 G/1
1 TABLET ORAL
Qty: 40 TABLET | Refills: 0 | Status: SHIPPED | OUTPATIENT
Start: 2024-09-14 | End: 2024-09-24

## 2024-09-14 RX ADMIN — SUCRALFATE 1 G: 1 TABLET ORAL at 19:35

## 2024-09-14 ASSESSMENT — LIFESTYLE VARIABLES
EVER FELT BAD OR GUILTY ABOUT YOUR DRINKING: NO
HAVE PEOPLE ANNOYED YOU BY CRITICIZING YOUR DRINKING: NO
HAVE YOU EVER FELT YOU SHOULD CUT DOWN ON YOUR DRINKING: NO
TOTAL SCORE: 0
EVER HAD A DRINK FIRST THING IN THE MORNING TO STEADY YOUR NERVES TO GET RID OF A HANGOVER: NO

## 2024-09-14 ASSESSMENT — COLUMBIA-SUICIDE SEVERITY RATING SCALE - C-SSRS
6. HAVE YOU EVER DONE ANYTHING, STARTED TO DO ANYTHING, OR PREPARED TO DO ANYTHING TO END YOUR LIFE?: NO
1. IN THE PAST MONTH, HAVE YOU WISHED YOU WERE DEAD OR WISHED YOU COULD GO TO SLEEP AND NOT WAKE UP?: NO
2. HAVE YOU ACTUALLY HAD ANY THOUGHTS OF KILLING YOURSELF?: NO

## 2024-09-14 ASSESSMENT — PAIN - FUNCTIONAL ASSESSMENT: PAIN_FUNCTIONAL_ASSESSMENT: 0-10

## 2024-09-14 ASSESSMENT — PAIN SCALES - GENERAL: PAINLEVEL_OUTOF10: 0 - NO PAIN

## 2024-09-14 NOTE — ED PROVIDER NOTES
HPI   Chief Complaint   Patient presents with    black stool/ nausea/ lightheaded after night drinking alcoh       Patient presents to the emergency department secondary to nausea, lightheadedness, dark stools and 1 episode of emesis.  This started today.  Patient states that he drank about 15 beers last night.  He feels like this likely exacerbated the symptoms.  He has a history of having 2 scopes in the past.  He had symptoms of possible bleeding in the past and was treated with omeprazole.  He has continued the omeprazole and has had no symptoms recently until today.  He denies abdominal pain.  No chest pain.  No bright red blood in the stools.  No other complaints at this time.                          [place] Coma Scale Score: 15                  Patient History   Past Medical History:   Diagnosis Date    Diarrhea     Esophageal ulcer     GERD (gastroesophageal reflux disease)     Otitis media, unspecified, right ear 2017    Right otitis media    Personal history of other diseases of the respiratory system 2017    History of acute pharyngitis    Personal history of other specified conditions 06/10/2017    History of diarrhea    Personal history of other specified conditions 2017    History of chest pain    Sleep apnea     Tachycardia, unspecified     Tachycardia     Past Surgical History:   Procedure Laterality Date    OTHER SURGICAL HISTORY  2016    Oral Surgery Tooth Extraction [place] Tooth     Family History   Problem Relation Name Age of Onset    Lung cancer Father       Social History     Tobacco Use    Smoking status: Former     Current packs/day: 0.00     Types: Cigarettes     Quit date:      Years since quittin.7    Smokeless tobacco: Never    Tobacco comments:     Vape.   Vaping Use    Vaping status: Every Day    Substances: Nicotine   Substance Use Topics    Alcohol use: Yes     Comment: 1-2 monthly    Drug use: Never       Physical Exam   ED Triage Vitals [24  1729]   Temperature Heart Rate Respirations BP   36.3 °C (97.3 °F) 84 18 130/82      Pulse Ox Temp Source Heart Rate Source Patient Position   98 % Temporal Monitor Sitting      BP Location FiO2 (%)     Left arm --       Physical Exam  Vitals reviewed.   Constitutional:       Appearance: Normal appearance.   HENT:      Head: Normocephalic.      Nose: Nose normal.      Mouth/Throat:      Mouth: Mucous membranes are moist.   Eyes:      Extraocular Movements: Extraocular movements intact.      Pupils: Pupils are equal, round, and reactive to light.   Cardiovascular:      Rate and Rhythm: Normal rate and regular rhythm.      Pulses: Normal pulses.      Heart sounds: Normal heart sounds.   Pulmonary:      Effort: Pulmonary effort is normal.      Breath sounds: Normal breath sounds. No wheezing, rhonchi or rales.   Abdominal:      General: Abdomen is flat. Bowel sounds are normal.      Palpations: Abdomen is soft.      Tenderness: There is no abdominal tenderness. There is no guarding or rebound.   Musculoskeletal:         General: No swelling. Normal range of motion.      Cervical back: Normal range of motion.   Skin:     General: Skin is warm and dry.      Capillary Refill: Capillary refill takes less than 2 seconds.   Neurological:      General: No focal deficit present.      Mental Status: He is alert and oriented to person, place, and time.   Psychiatric:         Mood and Affect: Mood normal.         Behavior: Behavior normal.       Labs Reviewed - No data to display  Pain Management Panel  More data exists         Latest Ref Rng & Units 12/29/2021 12/23/2021   Pain Management Panel   Amphetamine Screen, Urine NEGATIVE PRESUMPTIVE NEGATIVE  PRESUMPTIVE NEGATIVE    Barbiturate Screen, Urine NEGATIVE PRESUMPTIVE NEGATIVE  PRESUMPTIVE NEGATIVE    Fentanyl Screen, Urine NEGATIVE PRESUMPTIVE NEGATIVE  PRESUMPTIVE NEGATIVE    Methadone Screen, Urine NEGATIVE PRESUMPTIVE NEGATIVE  PRESUMPTIVE NEGATIVE       Details                  No orders to display     ED Course & MDM   Diagnoses as of 09/14/24 2051   Acute gastritis, presence of bleeding unspecified, unspecified gastritis type       Medical Decision Making  Patient presents secondary to dark stools.  Patient denies taking Kaopectate or Pepto-Bismol.  Patient is evaluated for acute bleeding with laboratory workup.  Patient has stable lab work without evidence of acute anemia.  At this time patient is hemodynamically stable.  Patient likely will need to see GI as an outpatient may need scope performed if symptoms persist.  If patient becomes lightheaded dizzy or has more significant symptoms he should be reevaluated for repeat lab work.  At this time he is stable for discharge and will follow-up with GI and primary care.  He should return for worsening or concerning symptoms.        Procedure  Procedures     Lolis Sherwood MD  09/14/24 2053

## 2025-04-04 ENCOUNTER — TELEPHONE (OUTPATIENT)
Dept: PRIMARY CARE | Facility: CLINIC | Age: 30
End: 2025-04-04
Payer: MEDICAID

## 2025-04-04 DIAGNOSIS — Z87.19 HISTORY OF DUODENAL ULCER: ICD-10-CM

## 2025-04-04 DIAGNOSIS — K21.9 GASTROESOPHAGEAL REFLUX DISEASE WITHOUT ESOPHAGITIS: ICD-10-CM

## 2025-04-04 DIAGNOSIS — Z87.19 HISTORY OF ESOPHAGEAL ULCER: ICD-10-CM

## 2025-04-04 NOTE — TELEPHONE ENCOUNTER
Pt requesting refill on Omeprazole to [facility].  Rx was sent to [facility] on 3/7 but they are closed now.  Pt states [facility] have sent a request several times for refill but I do not see in pt's chart

## 2025-04-07 RX ORDER — OMEPRAZOLE 40 MG/1
40 CAPSULE, DELAYED RELEASE ORAL DAILY
Qty: 90 CAPSULE | Refills: 0 | Status: SHIPPED | OUTPATIENT
Start: 2025-04-07

## 2025-04-24 ENCOUNTER — APPOINTMENT (OUTPATIENT)
Dept: PRIMARY CARE | Facility: CLINIC | Age: 30
End: 2025-04-24
Payer: COMMERCIAL

## 2025-04-24 VITALS
BODY MASS INDEX: 34.72 KG/M2 | HEIGHT: 73 IN | WEIGHT: 262 LBS | OXYGEN SATURATION: 100 % | HEART RATE: 75 BPM | SYSTOLIC BLOOD PRESSURE: 126 MMHG | TEMPERATURE: 97.6 F | DIASTOLIC BLOOD PRESSURE: 82 MMHG

## 2025-04-24 DIAGNOSIS — K21.9 GASTROESOPHAGEAL REFLUX DISEASE WITHOUT ESOPHAGITIS: ICD-10-CM

## 2025-04-24 DIAGNOSIS — K44.9 HIATAL HERNIA: ICD-10-CM

## 2025-04-24 DIAGNOSIS — Z87.19 HISTORY OF DUODENAL ULCER: ICD-10-CM

## 2025-04-24 DIAGNOSIS — Z00.00 ROUTINE ADULT HEALTH MAINTENANCE: Primary | ICD-10-CM

## 2025-04-24 DIAGNOSIS — Z87.19 HISTORY OF ESOPHAGEAL ULCER: ICD-10-CM

## 2025-04-24 PROCEDURE — 1036F TOBACCO NON-USER: CPT | Performed by: FAMILY MEDICINE

## 2025-04-24 PROCEDURE — 99395 PREV VISIT EST AGE 18-39: CPT | Performed by: FAMILY MEDICINE

## 2025-04-24 PROCEDURE — 99213 OFFICE O/P EST LOW 20 MIN: CPT | Performed by: FAMILY MEDICINE

## 2025-04-24 PROCEDURE — 3008F BODY MASS INDEX DOCD: CPT | Performed by: FAMILY MEDICINE

## 2025-04-24 RX ORDER — OMEPRAZOLE 40 MG/1
40 CAPSULE, DELAYED RELEASE ORAL DAILY
Qty: 90 CAPSULE | Refills: 3 | Status: SHIPPED | OUTPATIENT
Start: 2025-04-24

## 2025-04-24 ASSESSMENT — ENCOUNTER SYMPTOMS
CONSTIPATION: 0
ARTHRALGIAS: 0
CHILLS: 0
SHORTNESS OF BREATH: 0
WEAKNESS: 0
DIZZINESS: 0
FREQUENCY: 0
DIARRHEA: 1
DYSURIA: 0
WHEEZING: 0
ACTIVITY CHANGE: 0
PALPITATIONS: 0
ABDOMINAL PAIN: 0
MYALGIAS: 0
NAUSEA: 0
COUGH: 0
RHINORRHEA: 0
SORE THROAT: 0
VOMITING: 0
FEVER: 0
LIGHT-HEADEDNESS: 0
NUMBNESS: 0
DIFFICULTY URINATING: 0
APPETITE CHANGE: 0

## 2025-04-24 ASSESSMENT — PATIENT HEALTH QUESTIONNAIRE - PHQ9
1. LITTLE INTEREST OR PLEASURE IN DOING THINGS: NOT AT ALL
2. FEELING DOWN, DEPRESSED OR HOPELESS: NOT AT ALL
SUM OF ALL RESPONSES TO PHQ9 QUESTIONS 1 AND 2: 0

## 2025-04-24 NOTE — PROGRESS NOTES
"Subjective   Patient ID: Bebeto Baker is a 29 y.o. male who presents for GERD and Annual Exam and CPE    GERD  He reports no abdominal pain, no chest pain, no coughing, no nausea, no sore throat or no wheezing.      GERD - Symptoms have been fair controlled. He has been taking PPI everyday. Having a lot of belching issues.  Hiatal hernia seen on scope.  Has a lot of GERD sx    Review of Systems   Constitutional:  Negative for activity change, appetite change, chills and fever.   HENT:  Negative for congestion, ear pain, postnasal drip, rhinorrhea and sore throat.    Respiratory:  Negative for cough, shortness of breath and wheezing.    Cardiovascular:  Negative for chest pain and palpitations.   Gastrointestinal:  Positive for diarrhea. Negative for abdominal pain, constipation, nausea and vomiting.   Genitourinary:  Negative for difficulty urinating, dysuria and frequency.   Musculoskeletal:  Negative for arthralgias and myalgias.   Skin:  Negative for rash.   Neurological:  Negative for dizziness, weakness, light-headedness and numbness.       Objective   /82   Pulse 75   Temp 36.4 °C (97.6 °F)   Ht 1.842 m (6' 0.5\")   Wt 119 kg (262 lb)   SpO2 100%   BMI 35.05 kg/m²     Physical Exam  Constitutional:       General: He is not in acute distress.     Appearance: Normal appearance.   HENT:      Head: Normocephalic and atraumatic.      Mouth/Throat:      Mouth: Mucous membranes are moist.      Pharynx: Oropharynx is clear.   Eyes:      Conjunctiva/sclera: Conjunctivae normal.   Cardiovascular:      Rate and Rhythm: Normal rate and regular rhythm.      Pulses: Normal pulses.      Heart sounds: Normal heart sounds. No murmur heard.  Pulmonary:      Effort: Pulmonary effort is normal. No respiratory distress.      Breath sounds: Normal breath sounds. No wheezing or rhonchi.   Abdominal:      General: There is no distension.      Tenderness: There is no abdominal tenderness. There is no guarding or " rebound.   Musculoskeletal:         General: Normal range of motion.      Cervical back: Normal range of motion and neck supple. No tenderness.   Lymphadenopathy:      Cervical: No cervical adenopathy.   Skin:     General: Skin is warm.   Neurological:      General: No focal deficit present.      Mental Status: He is alert and oriented to person, place, and time.   Psychiatric:         Mood and Affect: Mood normal.         Assessment/Plan   Problem List Items Addressed This Visit           ICD-10-CM    Gastroesophageal reflux disease K21.9    Relevant Medications    omeprazole (PriLOSEC) 40 mg DR capsule     Other Visit Diagnoses         Codes      Routine adult health maintenance    -  Primary Z00.00    Relevant Orders    Lipid Panel    Comprehensive Metabolic Panel    CBC and Auto Differential    Comprehensive metabolic panel    Lipid panel    Hemoglobin A1c      Hiatal hernia     K44.9    Relevant Orders    Referral to General Surgery      History of esophageal ulcer     Z87.19    Relevant Medications    omeprazole (PriLOSEC) 40 mg DR capsule      History of duodenal ulcer     Z87.19    Relevant Medications    omeprazole (PriLOSEC) 40 mg DR capsule          Continue PPI    Checking BW.  Lose weight.  Learn to eat more whole foods.  Ref to gen surg for possible hiatal hernia intervention.

## 2025-05-12 ENCOUNTER — APPOINTMENT (OUTPATIENT)
Dept: SURGERY | Facility: CLINIC | Age: 30
End: 2025-05-12
Payer: COMMERCIAL

## 2025-05-12 ENCOUNTER — OFFICE VISIT (OUTPATIENT)
Dept: SURGERY | Facility: CLINIC | Age: 30
End: 2025-05-12
Payer: COMMERCIAL

## 2025-05-12 VITALS
OXYGEN SATURATION: 98 % | WEIGHT: 255.2 LBS | BODY MASS INDEX: 33.82 KG/M2 | HEART RATE: 63 BPM | SYSTOLIC BLOOD PRESSURE: 134 MMHG | DIASTOLIC BLOOD PRESSURE: 80 MMHG | HEIGHT: 73 IN

## 2025-05-12 DIAGNOSIS — K44.9 HIATAL HERNIA: Primary | ICD-10-CM

## 2025-05-12 PROCEDURE — 99204 OFFICE O/P NEW MOD 45 MIN: CPT | Performed by: STUDENT IN AN ORGANIZED HEALTH CARE EDUCATION/TRAINING PROGRAM

## 2025-05-12 PROCEDURE — 3008F BODY MASS INDEX DOCD: CPT | Performed by: STUDENT IN AN ORGANIZED HEALTH CARE EDUCATION/TRAINING PROGRAM

## 2025-05-12 PROCEDURE — 1036F TOBACCO NON-USER: CPT | Performed by: STUDENT IN AN ORGANIZED HEALTH CARE EDUCATION/TRAINING PROGRAM

## 2025-05-12 NOTE — PROGRESS NOTES
History Of Present Illness  Patient is a 29 y.o. male who presents to discuss further workup and intervention for his chronic GERD and known small hiatal hernia.  He does have a fairly significant history with his reflux disease including both esophageal and duodenal ulcers.  These seem to have improved with PPIs, however his symptoms do persist.  These are mainly with eating which include epigastric pain, burning type sensation, eructations.  The PPIs do seem to improve the symptoms however they do persist.  He has had both an EGD and a colonoscopy.  EGD in January 2024 showed a medium type I hiatal hernia with resolution of his duodenal ulcers.  Colonoscopy overall normal, recommend repeating in 5 years.  He also had a CTE in February 2024 which also did show the small type I hiatal hernia.     Past Medical History  Medical History[1]    Surgical History  Surgical History[2]     Social History  He reports that he quit smoking about 3 years ago. His smoking use included cigarettes. He has never used smokeless tobacco. He reports that he does not currently use alcohol. He reports current drug use. Drug: Marijuana.    Family History  Family History[3]     Allergies  Patient has no known allergies.    Review of Systems  - CONSTITUTIONAL: Denies fever and chills.  - HEENT: Denies changes in vision and hearing.  - RESPIRATORY: Denies SOB and cough.  - CV: Denies palpitations and CP.  - GI: see HPI  - : Denies dysuria and urinary frequency.  - MSK: Denies myalgia and joint pain.  - SKIN: Denies rash and pruritus.  - NEUROLOGICAL: Denies headache and syncope.  - PSYCHIATRIC: Denies recent changes in mood. Denies anxiety and depression.     Physical Exam  - GENERAL: Alert and oriented x 3. No acute distress. Well-nourished.  - EYES: EOMI. Anicteric.  - HENT: Moist mucous membranes. No scleral icterus. No cervical lymphadenopathy.  - LUNGS: Breathing comfortably on room air. No accessory muscle use, no distress.  -  "CARDIOVASCULAR: Regular rate and rhythm. No murmur. No JVD.  - ABDOMEN: Soft, non-tender and non-distended. No palpable masses.  - EXTREMITIES: No edema. Non-tender.  - SKIN: No rashes or lesions. Warm.  - NEUROLOGIC: No focal neurological deficits. CN II-XII grossly intact, but not individually tested.  - PSYCHIATRIC: Cooperative. Appropriate mood and affect.    Last Recorded Vitals  Blood pressure 134/80, pulse 63, height 1.854 m (6' 1\"), weight 116 kg (255 lb 3.2 oz), SpO2 98%.    Relevant Results  Imaging  No results found.    Cardiology, Vascular, and Other Imaging  No other imaging results found for the past 7 days       Assessment and Plan  Patient is a 29 y.o. male who presents to discuss further workup intervention for his ongoing reflux type symptoms in the setting of a known type I hiatal hernia.  We discussed at length the etiology, pathophysiology, natural course, various treatment options for uncontrolled reflux in the setting of a hiatal hernia.  We also discussed the need for further workup and testing prior to the recommendation for any possible interventions.  These would include a noncontrasted CT scan of the abdomen pelvis to evaluate his hernia anatomy, EGD with [facility] and manometry.  He does live in [place] however is okay with proceeding with his testing at [facility].  His questions were answered and he will be scheduled for the above testing in the near future.  After the results of these testing have resulted, we will arrange for a virtual follow-up to discuss the results and recommendations for any possible interventions.    Bebeto Lee MD         [1]   Past Medical History:  Diagnosis Date    Diarrhea     Esophageal ulcer     GERD (gastroesophageal reflux disease)     Otitis media, unspecified, right ear 08/27/2017    Right otitis media    Personal history of other diseases of the respiratory system 08/27/2017    History of acute pharyngitis    Personal history of other specified " conditions 06/10/2017    History of diarrhea    Personal history of other specified conditions 05/31/2017    History of chest pain    Sleep apnea     Tachycardia, unspecified     Tachycardia   [2]   Past Surgical History:  Procedure Laterality Date    OTHER SURGICAL HISTORY  07/13/2016    Oral Surgery Tooth Extraction [place] Tooth   [3]   Family History  Problem Relation Name Age of Onset    Lung cancer Father

## 2025-05-21 LAB
ALBUMIN SERPL-MCNC: 4.5 G/DL (ref 3.6–5.1)
ALP SERPL-CCNC: 77 U/L (ref 36–130)
ALT SERPL-CCNC: 26 U/L (ref 9–46)
ANION GAP SERPL CALCULATED.4IONS-SCNC: 9 MMOL/L (CALC) (ref 7–17)
AST SERPL-CCNC: 16 U/L (ref 10–40)
BASOPHILS # BLD AUTO: 39 CELLS/UL (ref 0–200)
BASOPHILS NFR BLD AUTO: 0.6 %
BILIRUB SERPL-MCNC: 0.5 MG/DL (ref 0.2–1.2)
BUN SERPL-MCNC: 11 MG/DL (ref 7–25)
CALCIUM SERPL-MCNC: 9.1 MG/DL (ref 8.6–10.3)
CHLORIDE SERPL-SCNC: 104 MMOL/L (ref 98–110)
CHOLEST SERPL-MCNC: 201 MG/DL
CHOLEST/HDLC SERPL: 4.8 (CALC)
CO2 SERPL-SCNC: 26 MMOL/L (ref 20–32)
CREAT SERPL-MCNC: 0.99 MG/DL (ref 0.6–1.24)
EGFRCR SERPLBLD CKD-EPI 2021: 106 ML/MIN/1.73M2
EOSINOPHIL # BLD AUTO: 130 CELLS/UL (ref 15–500)
EOSINOPHIL NFR BLD AUTO: 2 %
ERYTHROCYTE [DISTWIDTH] IN BLOOD BY AUTOMATED COUNT: 16.9 % (ref 11–15)
GLUCOSE SERPL-MCNC: 83 MG/DL (ref 65–99)
HCT VFR BLD AUTO: 44.3 % (ref 38.5–50)
HDLC SERPL-MCNC: 42 MG/DL
HGB BLD-MCNC: 13.8 G/DL (ref 13.2–17.1)
LDLC SERPL CALC-MCNC: 142 MG/DL (CALC)
LYMPHOCYTES # BLD AUTO: 2139 CELLS/UL (ref 850–3900)
LYMPHOCYTES NFR BLD AUTO: 32.9 %
MCH RBC QN AUTO: 23.6 PG (ref 27–33)
MCHC RBC AUTO-ENTMCNC: 31.2 G/DL (ref 32–36)
MCV RBC AUTO: 75.9 FL (ref 80–100)
MONOCYTES # BLD AUTO: 475 CELLS/UL (ref 200–950)
MONOCYTES NFR BLD AUTO: 7.3 %
NEUTROPHILS # BLD AUTO: 3718 CELLS/UL (ref 1500–7800)
NEUTROPHILS NFR BLD AUTO: 57.2 %
NONHDLC SERPL-MCNC: 159 MG/DL (CALC)
PLATELET # BLD AUTO: 252 THOUSAND/UL (ref 140–400)
PMV BLD REES-ECKER: 10.4 FL (ref 7.5–12.5)
POTASSIUM SERPL-SCNC: 4.6 MMOL/L (ref 3.5–5.3)
PROT SERPL-MCNC: 7.2 G/DL (ref 6.1–8.1)
RBC # BLD AUTO: 5.84 MILLION/UL (ref 4.2–5.8)
SODIUM SERPL-SCNC: 139 MMOL/L (ref 135–146)
TRIGL SERPL-MCNC: 77 MG/DL
WBC # BLD AUTO: 6.5 THOUSAND/UL (ref 3.8–10.8)

## 2025-05-22 ENCOUNTER — HOSPITAL ENCOUNTER (OUTPATIENT)
Dept: RADIOLOGY | Facility: CLINIC | Age: 30
Discharge: HOME | End: 2025-05-22
Payer: COMMERCIAL

## 2025-05-22 DIAGNOSIS — K44.9 HIATAL HERNIA: ICD-10-CM

## 2025-05-22 PROCEDURE — 74176 CT ABD & PELVIS W/O CONTRAST: CPT

## 2025-06-10 ENCOUNTER — HOSPITAL ENCOUNTER (OUTPATIENT)
Dept: GASTROENTEROLOGY | Facility: HOSPITAL | Age: 30
Discharge: HOME | End: 2025-06-10
Payer: COMMERCIAL

## 2025-06-10 VITALS
OXYGEN SATURATION: 98 % | DIASTOLIC BLOOD PRESSURE: 94 MMHG | RESPIRATION RATE: 18 BRPM | BODY MASS INDEX: 33.8 KG/M2 | WEIGHT: 255 LBS | HEIGHT: 73 IN | HEART RATE: 92 BPM | SYSTOLIC BLOOD PRESSURE: 143 MMHG

## 2025-06-10 DIAGNOSIS — K44.9 HIATAL HERNIA: ICD-10-CM

## 2025-06-10 PROCEDURE — 2500000005 HC RX 250 GENERAL PHARMACY W/O HCPCS: Performed by: SURGERY

## 2025-06-10 PROCEDURE — 91010 ESOPHAGUS MOTILITY STUDY: CPT

## 2025-06-10 RX ORDER — LIDOCAINE HYDROCHLORIDE 20 MG/ML
JELLY TOPICAL AS NEEDED
Status: DISCONTINUED | OUTPATIENT
Start: 2025-06-10 | End: 2025-06-11 | Stop reason: HOSPADM

## 2025-06-10 RX ADMIN — LIDOCAINE HYDROCHLORIDE 3 ML: 20 JELLY TOPICAL at 13:26

## 2025-06-10 ASSESSMENT — PAIN - FUNCTIONAL ASSESSMENT
PAIN_FUNCTIONAL_ASSESSMENT: 0-10
PAIN_FUNCTIONAL_ASSESSMENT: 0-10

## 2025-06-10 ASSESSMENT — PAIN SCALES - GENERAL
PAINLEVEL_OUTOF10: 0 - NO PAIN
PAINLEVEL_OUTOF10: 0 - NO PAIN

## 2025-06-10 NOTE — NURSING NOTE
Esophageal Manometry Test    Referring Provider: Bebeto Lee MD  [id number]  [place] [address]    Indication: Hiatal hernia [K44.9]     Symptoms: Chest tightness, difficulty breathing, heartburn, metallic taste    Age: 30 y.o.    [facility].  Procedure explained-all questions answered. Placed 2% lido viscous bilateral nares via syringe. Probe placed right nare without difficulty, placed left lateral position with HOB elevated 20 degrees per/protocol. Probe adjusted for proper positioning. Swallows performed per/protocol, probe removed intact without difficulty. Discharged patient to home in stable condition without complaint.

## 2025-06-10 NOTE — DISCHARGE INSTRUCTIONS
Return to normal diet, medications and activities. Today, you may experience slight nasal and throat discomfort along with minimal nosebleed. Follow up with ordering provider, Dr. Lee, in about two weeks for results. Please call [phone] if you need to speak with the manometry nurse about your test.

## 2025-07-22 ENCOUNTER — ANESTHESIA EVENT (OUTPATIENT)
Dept: GASTROENTEROLOGY | Facility: HOSPITAL | Age: 30
End: 2025-07-22
Payer: COMMERCIAL

## 2025-07-22 ENCOUNTER — ANESTHESIA (OUTPATIENT)
Dept: GASTROENTEROLOGY | Facility: HOSPITAL | Age: 30
End: 2025-07-22
Payer: COMMERCIAL

## 2025-07-22 ENCOUNTER — HOSPITAL ENCOUNTER (OUTPATIENT)
Dept: GASTROENTEROLOGY | Facility: HOSPITAL | Age: 30
Discharge: HOME | End: 2025-07-22
Payer: COMMERCIAL

## 2025-07-22 VITALS
DIASTOLIC BLOOD PRESSURE: 74 MMHG | HEIGHT: 73 IN | HEART RATE: 68 BPM | RESPIRATION RATE: 13 BRPM | WEIGHT: 251.32 LBS | BODY MASS INDEX: 33.31 KG/M2 | TEMPERATURE: 97.7 F | OXYGEN SATURATION: 99 % | SYSTOLIC BLOOD PRESSURE: 124 MMHG

## 2025-07-22 DIAGNOSIS — K44.9 HIATAL HERNIA: ICD-10-CM

## 2025-07-22 PROCEDURE — 2720000007 HC OR 272 NO HCPCS

## 2025-07-22 PROCEDURE — 7100000009 HC PHASE TWO TIME - INITIAL BASE CHARGE

## 2025-07-22 PROCEDURE — 2500000004 HC RX 250 GENERAL PHARMACY W/ HCPCS (ALT 636 FOR OP/ED): Performed by: ANESTHESIOLOGIST ASSISTANT

## 2025-07-22 PROCEDURE — 7100000010 HC PHASE TWO TIME - EACH INCREMENTAL 1 MINUTE

## 2025-07-22 PROCEDURE — 3700000002 HC GENERAL ANESTHESIA TIME - EACH INCREMENTAL 1 MINUTE

## 2025-07-22 PROCEDURE — 3700000001 HC GENERAL ANESTHESIA TIME - INITIAL BASE CHARGE

## 2025-07-22 PROCEDURE — 43235 EGD DIAGNOSTIC BRUSH WASH: CPT | Performed by: INTERNAL MEDICINE

## 2025-07-22 RX ORDER — PROPOFOL 10 MG/ML
INJECTION, EMULSION INTRAVENOUS AS NEEDED
Status: DISCONTINUED | OUTPATIENT
Start: 2025-07-22 | End: 2025-07-22

## 2025-07-22 RX ORDER — LIDOCAINE HYDROCHLORIDE 20 MG/ML
INJECTION, SOLUTION INFILTRATION; PERINEURAL AS NEEDED
Status: DISCONTINUED | OUTPATIENT
Start: 2025-07-22 | End: 2025-07-22

## 2025-07-22 RX ORDER — MIDAZOLAM HYDROCHLORIDE 2 MG/2ML
INJECTION, SOLUTION INTRAMUSCULAR; INTRAVENOUS AS NEEDED
Status: DISCONTINUED | OUTPATIENT
Start: 2025-07-22 | End: 2025-07-22

## 2025-07-22 RX ORDER — DEXMEDETOMIDINE IN 0.9 % NACL 20 MCG/5ML
SYRINGE (ML) INTRAVENOUS AS NEEDED
Status: DISCONTINUED | OUTPATIENT
Start: 2025-07-22 | End: 2025-07-22

## 2025-07-22 RX ADMIN — PROPOFOL 70 MG: 10 INJECTION, EMULSION INTRAVENOUS at 15:00

## 2025-07-22 RX ADMIN — PROPOFOL 400 MG: 10 INJECTION, EMULSION INTRAVENOUS at 15:03

## 2025-07-22 RX ADMIN — Medication 20 MCG: at 15:00

## 2025-07-22 RX ADMIN — MIDAZOLAM HYDROCHLORIDE 2 MG: 1 INJECTION, SOLUTION INTRAMUSCULAR; INTRAVENOUS at 14:58

## 2025-07-22 RX ADMIN — PROPOFOL 30 MG: 10 INJECTION, EMULSION INTRAVENOUS at 15:02

## 2025-07-22 RX ADMIN — SODIUM CHLORIDE: 9 INJECTION, SOLUTION INTRAVENOUS at 14:57

## 2025-07-22 RX ADMIN — LIDOCAINE HYDROCHLORIDE 100 MG: 20 INJECTION, SOLUTION INFILTRATION; PERINEURAL at 15:00

## 2025-07-22 ASSESSMENT — PAIN SCALES - GENERAL
PAINLEVEL_OUTOF10: 0 - NO PAIN

## 2025-07-22 ASSESSMENT — PAIN - FUNCTIONAL ASSESSMENT
PAIN_FUNCTIONAL_ASSESSMENT: 0-10

## 2025-07-22 NOTE — ANESTHESIA PREPROCEDURE EVALUATION
"Patient: Bebeto Baker    Procedure Information       Date/Time: 07/22/25 1440    Scheduled providers: Ferny Henderson MD; Rakesh Zambrano MD; KARINA Powers    Procedures:       BRAVO      EGD    Location: [facility]            Relevant Problems   Neuro   (+) Bipolar disorder, unspecified (Multi)   (+) Schizophrenia      GI   (+) Chronic diarrhea   (+) Gastroesophageal reflux disease      Hematology   (+) Iron deficiency anemia      Musculoskeletal   (+) Idiopathic scoliosis and kyphoscoliosis       Clinical information reviewed:   Tobacco  Allergies  Meds   Med Hx  Surg Hx   Fam Hx  Soc Hx         Medical History[1]   Surgical History[2]  Social History[3]   Current Outpatient Medications   Medication Instructions    omeprazole (PRILOSEC) 40 mg, oral, Daily      RX Allergies[4]     Chemistry    Lab Results   Component Value Date/Time     05/20/2025 1013    K 4.6 05/20/2025 1013     05/20/2025 1013    CO2 26 05/20/2025 1013    BUN 11 05/20/2025 1013    CREATININE 0.99 05/20/2025 1013    Lab Results   Component Value Date/Time    CALCIUM 9.1 05/20/2025 1013    ALKPHOS 77 05/20/2025 1013    AST 16 05/20/2025 1013    ALT 26 05/20/2025 1013    BILITOT 0.5 05/20/2025 1013          Lab Results   Component Value Date    HGBA1C 5.0 12/24/2020     Lab Results   Component Value Date/Time    WBC 6.5 05/20/2025 1013    HGB 13.8 05/20/2025 1013    HCT 44.3 05/20/2025 1013     05/20/2025 1013     Lab Results   Component Value Date/Time    PROTIME 11.9 09/14/2024 1859    INR 1.1 09/14/2024 1859     No results found for this or any previous visit (from the past 4464 hours).   No results found for this or any previous visit from the past 1095 days.        Visit Vitals  /83   Pulse 87   Temp 36.2 °C (97.2 °F) (Temporal)   Resp 16   Ht 1.854 m (6' 1\")   Wt 114 kg (251 lb 5.2 oz)   SpO2 98%   BMI 33.16 kg/m²   Smoking Status Former   BSA 2.42 m²     NPO/Void Status  Carbohydrate Drink " Given Prior to Surgery? : N  Date of Last Liquid: 07/22/25  Time of Last Liquid: 1130  Date of Last Solid: 07/21/25  Time of Last Solid: 2000  Last Intake Type: Clear fluids  Time of Last Void: 1422        Physical Exam    Airway  Mallampati: III  TM distance: >3 FB  Neck ROM: full  Mouth opening: 3 or more finger widths     Cardiovascular - normal exam  Rhythm: regular  Rate: normal     Dental    Pulmonary - normal exam   Abdominal - normal exam           Anesthesia Plan    History of general anesthesia?: yes  History of complications of general anesthesia?: no    ASA 2     MAC   (Standard ASA monitoring. Discussed possibility of transient awareness and recall with the patient.)  intravenous induction   Anesthetic plan and risks discussed with patient.    Plan discussed with CRNA and CAA.             [1]   Past Medical History:  Diagnosis Date    Bipolar disorder     Esophageal ulcer     GERD (gastroesophageal reflux disease)     Sleep apnea     Tachycardia, unspecified     Tachycardia   [2]   Past Surgical History:  Procedure Laterality Date    COLONOSCOPY      DENTAL SURGERY      ESOPHAGOGASTRODUODENOSCOPY     [3]   Social History  Tobacco Use    Smoking status: Former     Current packs/day: 0.00     Types: Cigarettes     Quit date: 2022     Years since quitting: 3.5    Smokeless tobacco: Never    Tobacco comments:     Vape. Nicotine pouches   Vaping Use    Vaping status: Former    Substances: Nicotine   Substance Use Topics    Alcohol use: Yes     Comment: 3-4 monthly    Drug use: Yes     Frequency: 4.0 times per week     Types: Marijuana     Comment: once every 2 days   [4] No Known Allergies

## 2025-07-22 NOTE — H&P
History Of Present Illness  Bebeto Baker is a 30 y.o. male presenting here for open-access EGD with Bravo pH placement for heartburn. A prior EGD 1/19/24 (Dr. Henson) revealed a 4 cm hiatal hernia. Gastric biopsies revealed mild chronic inflammation with focal intestinal metaplasia; H pylori was negative. Duodenal biopsies were normal. A recent EMOT revealed a hiatal hernia and a hypotensive LES.     Past Medical History  Medical History[1]  Surgical History  Surgical History[2]  Social History  He reports that he quit smoking about 3 years ago. His smoking use included cigarettes. He has never used smokeless tobacco. He reports current alcohol use. He reports current drug use. Frequency: 4.00 times per week. Drug: Marijuana.    Family History  Family History[3]     Allergies  Allergies[4]       Physical Exam  Constitutional:       Appearance: He is obese.   HENT:      Mouth/Throat:      Mouth: Mucous membranes are moist.     Eyes:      Conjunctiva/sclera: Conjunctivae normal.       Cardiovascular:      Rate and Rhythm: Normal rate.   Pulmonary:      Effort: Pulmonary effort is normal.   Abdominal:      Palpations: Abdomen is soft.     Skin:     General: Skin is warm.     Neurological:      Mental Status: He is oriented to person, place, and time.     Psychiatric:         Mood and Affect: Mood normal.          Last Recorded Vitals  There were no vitals taken for this visit.    Assessment/Plan   open-access EGD with Bravo pH placement for heartburn. A prior EGD 1/19/24 (Dr. Henson) revealed a 4 cm hiatal hernia. Gastric biopsies revealed mild chronic inflammation with focal intestinal metaplasia; H pylori was negative. Duodenal biopsies were normal. A recent EMOT revealed a hiatal hernia and a hypotensive LES.     Ferny Henderson MD       [1]   Past Medical History:  Diagnosis Date    Bipolar disorder     Esophageal ulcer     GERD (gastroesophageal reflux disease)     HIV (human immunodeficiency virus  infection)     Sleep apnea     Tachycardia, unspecified     Tachycardia   [2]   Past Surgical History:  Procedure Laterality Date    COLONOSCOPY      DENTAL SURGERY      ESOPHAGOGASTRODUODENOSCOPY     [3]   Family History  Problem Relation Name Age of Onset    Lung cancer Father     [4] No Known Allergies

## 2025-07-22 NOTE — DISCHARGE INSTRUCTIONS

## 2025-07-23 ASSESSMENT — PAIN SCALES - GENERAL: PAINLEVEL_OUTOF10: 0 - NO PAIN

## 2025-07-23 NOTE — ANESTHESIA POSTPROCEDURE EVALUATION
Patient: Bebeto Baker    Procedure Summary       Date: 07/22/25 Room / Location: [facility]    Anesthesia Start: 1457 Anesthesia Stop: 1519    Procedures:       BRAVO      EGD Diagnosis: Hiatal hernia    Scheduled Providers: Ferny Henderson MD; Rakesh Zambrano MD; KARINA Powers Responsible Provider: Rakesh Zambrano MD    Anesthesia Type: MAC ASA Status: 2            Anesthesia Type: MAC    Vitals Value Taken Time   /75 07/22/25 15:52   Temp 36.5 °C (97.7 °F) 07/22/25 15:50   Pulse 63 07/22/25 15:52   Resp 13 07/22/25 15:50   SpO2 97 % 07/22/25 15:52   Vitals shown include unfiled device data.    Anesthesia Post Evaluation    Patient location during evaluation: PACU  Patient participation: complete - patient participated  Level of consciousness: awake and alert  Pain management: adequate  Airway patency: patent  Cardiovascular status: acceptable and hemodynamically stable  Respiratory status: acceptable, spontaneous ventilation and nonlabored ventilation  Hydration status: acceptable  Postoperative Nausea and Vomiting: none        There were no known notable events for this encounter.

## 2025-07-24 ENCOUNTER — TELEPHONE (OUTPATIENT)
Dept: GASTROENTEROLOGY | Facility: HOSPITAL | Age: 30
End: 2025-07-24
Payer: COMMERCIAL

## 2026-04-27 ENCOUNTER — APPOINTMENT (OUTPATIENT)
Dept: PRIMARY CARE | Facility: CLINIC | Age: 31
End: 2026-04-27
Payer: COMMERCIAL